# Patient Record
Sex: FEMALE | Race: OTHER | Employment: UNEMPLOYED | URBAN - METROPOLITAN AREA
[De-identification: names, ages, dates, MRNs, and addresses within clinical notes are randomized per-mention and may not be internally consistent; named-entity substitution may affect disease eponyms.]

---

## 2023-05-17 ENCOUNTER — OFFICE VISIT (OUTPATIENT)
Dept: INTERNAL MEDICINE CLINIC | Facility: CLINIC | Age: 48
End: 2023-05-17
Payer: MEDICAID

## 2023-05-17 ENCOUNTER — LAB ENCOUNTER (OUTPATIENT)
Dept: LAB | Facility: HOSPITAL | Age: 48
End: 2023-05-17
Attending: INTERNAL MEDICINE
Payer: MEDICAID

## 2023-05-17 VITALS
TEMPERATURE: 98 F | HEIGHT: 59 IN | DIASTOLIC BLOOD PRESSURE: 62 MMHG | OXYGEN SATURATION: 98 % | SYSTOLIC BLOOD PRESSURE: 104 MMHG | BODY MASS INDEX: 32.66 KG/M2 | HEART RATE: 74 BPM | WEIGHT: 162 LBS

## 2023-05-17 DIAGNOSIS — Z13.21 ENCOUNTER FOR VITAMIN DEFICIENCY SCREENING: ICD-10-CM

## 2023-05-17 DIAGNOSIS — R53.83 OTHER FATIGUE: ICD-10-CM

## 2023-05-17 DIAGNOSIS — M17.0 PRIMARY OSTEOARTHRITIS OF BOTH KNEES: ICD-10-CM

## 2023-05-17 DIAGNOSIS — E11.65 UNCONTROLLED TYPE 2 DIABETES MELLITUS WITH HYPERGLYCEMIA (HCC): ICD-10-CM

## 2023-05-17 DIAGNOSIS — Z12.4 PAP SMEAR FOR CERVICAL CANCER SCREENING: ICD-10-CM

## 2023-05-17 DIAGNOSIS — Z76.89 ESTABLISHING CARE WITH NEW DOCTOR, ENCOUNTER FOR: ICD-10-CM

## 2023-05-17 DIAGNOSIS — Z13.89 NEPHROPATHY SCREEN: ICD-10-CM

## 2023-05-17 DIAGNOSIS — Z11.59 NEED FOR HEPATITIS C SCREENING TEST: ICD-10-CM

## 2023-05-17 DIAGNOSIS — Z12.11 ENCOUNTER FOR SCREENING COLONOSCOPY: ICD-10-CM

## 2023-05-17 DIAGNOSIS — R73.09 ELEVATED GLUCOSE: ICD-10-CM

## 2023-05-17 DIAGNOSIS — R74.8 ELEVATED LIVER ENZYMES: ICD-10-CM

## 2023-05-17 DIAGNOSIS — Z76.89 ESTABLISHING CARE WITH NEW DOCTOR, ENCOUNTER FOR: Primary | ICD-10-CM

## 2023-05-17 DIAGNOSIS — Z13.5 DIABETIC RETINOPATHY SCREENING: ICD-10-CM

## 2023-05-17 LAB
ALBUMIN SERPL-MCNC: 3.6 G/DL (ref 3.4–5)
ALBUMIN/GLOB SERPL: 0.9 {RATIO} (ref 1–2)
ALP LIVER SERPL-CCNC: 120 U/L
ALT SERPL-CCNC: 158 U/L
ANION GAP SERPL CALC-SCNC: 5 MMOL/L (ref 0–18)
AST SERPL-CCNC: 83 U/L (ref 15–37)
BASOPHILS # BLD AUTO: 0.03 X10(3) UL (ref 0–0.2)
BASOPHILS NFR BLD AUTO: 0.3 %
BILIRUB SERPL-MCNC: 0.3 MG/DL (ref 0.1–2)
BUN BLD-MCNC: 10 MG/DL (ref 7–18)
BUN/CREAT SERPL: 21.7 (ref 10–20)
CALCIUM BLD-MCNC: 9.5 MG/DL (ref 8.5–10.1)
CHLORIDE SERPL-SCNC: 102 MMOL/L (ref 98–112)
CHOLEST SERPL-MCNC: 259 MG/DL (ref ?–200)
CO2 SERPL-SCNC: 28 MMOL/L (ref 21–32)
CREAT BLD-MCNC: 0.46 MG/DL
CREAT UR-SCNC: 50.1 MG/DL
DEPRECATED RDW RBC AUTO: 41 FL (ref 35.1–46.3)
EOSINOPHIL # BLD AUTO: 0.21 X10(3) UL (ref 0–0.7)
EOSINOPHIL NFR BLD AUTO: 2.4 %
ERYTHROCYTE [DISTWIDTH] IN BLOOD BY AUTOMATED COUNT: 13.2 % (ref 11–15)
EST. AVERAGE GLUCOSE BLD GHB EST-MCNC: 289 MG/DL (ref 68–126)
FASTING PATIENT LIPID ANSWER: NO
FASTING STATUS PATIENT QL REPORTED: NO
GFR SERPLBLD BASED ON 1.73 SQ M-ARVRAT: 117 ML/MIN/1.73M2 (ref 60–?)
GLOBULIN PLAS-MCNC: 4.1 G/DL (ref 2.8–4.4)
GLUCOSE BLD-MCNC: 279 MG/DL (ref 70–99)
HBA1C MFR BLD: 11.7 % (ref ?–5.7)
HCT VFR BLD AUTO: 43.2 %
HCV AB SERPL QL IA: NONREACTIVE
HDLC SERPL-MCNC: 45 MG/DL (ref 40–59)
HGB BLD-MCNC: 14.2 G/DL
IMM GRANULOCYTES # BLD AUTO: 0.01 X10(3) UL (ref 0–1)
IMM GRANULOCYTES NFR BLD: 0.1 %
LDLC SERPL CALC-MCNC: 180 MG/DL (ref ?–100)
LYMPHOCYTES # BLD AUTO: 4.47 X10(3) UL (ref 1–4)
LYMPHOCYTES NFR BLD AUTO: 50.9 %
MCH RBC QN AUTO: 28.1 PG (ref 26–34)
MCHC RBC AUTO-ENTMCNC: 32.9 G/DL (ref 31–37)
MCV RBC AUTO: 85.5 FL
MICROALBUMIN UR-MCNC: 0.6 MG/DL
MICROALBUMIN/CREAT 24H UR-RTO: 12 UG/MG (ref ?–30)
MONOCYTES # BLD AUTO: 0.53 X10(3) UL (ref 0.1–1)
MONOCYTES NFR BLD AUTO: 6 %
NEUTROPHILS # BLD AUTO: 3.53 X10 (3) UL (ref 1.5–7.7)
NEUTROPHILS # BLD AUTO: 3.53 X10(3) UL (ref 1.5–7.7)
NEUTROPHILS NFR BLD AUTO: 40.3 %
NONHDLC SERPL-MCNC: 214 MG/DL (ref ?–130)
OSMOLALITY SERPL CALC.SUM OF ELEC: 289 MOSM/KG (ref 275–295)
PLATELET # BLD AUTO: 201 10(3)UL (ref 150–450)
POTASSIUM SERPL-SCNC: 3.8 MMOL/L (ref 3.5–5.1)
PROT SERPL-MCNC: 7.7 G/DL (ref 6.4–8.2)
RBC # BLD AUTO: 5.05 X10(6)UL
SODIUM SERPL-SCNC: 135 MMOL/L (ref 136–145)
T4 FREE SERPL-MCNC: 0.9 NG/DL (ref 0.8–1.7)
TRIGL SERPL-MCNC: 182 MG/DL (ref 30–149)
TSI SER-ACNC: 0.93 MIU/ML (ref 0.36–3.74)
VIT D+METAB SERPL-MCNC: 36.4 NG/ML (ref 30–100)
VLDLC SERPL CALC-MCNC: 37 MG/DL (ref 0–30)
WBC # BLD AUTO: 8.8 X10(3) UL (ref 4–11)

## 2023-05-17 PROCEDURE — 85025 COMPLETE CBC W/AUTO DIFF WBC: CPT | Performed by: INTERNAL MEDICINE

## 2023-05-17 PROCEDURE — 3078F DIAST BP <80 MM HG: CPT | Performed by: INTERNAL MEDICINE

## 2023-05-17 PROCEDURE — 36415 COLL VENOUS BLD VENIPUNCTURE: CPT | Performed by: INTERNAL MEDICINE

## 2023-05-17 PROCEDURE — 82043 UR ALBUMIN QUANTITATIVE: CPT

## 2023-05-17 PROCEDURE — 84439 ASSAY OF FREE THYROXINE: CPT

## 2023-05-17 PROCEDURE — 3074F SYST BP LT 130 MM HG: CPT | Performed by: INTERNAL MEDICINE

## 2023-05-17 PROCEDURE — 82570 ASSAY OF URINE CREATININE: CPT

## 2023-05-17 PROCEDURE — 3061F NEG MICROALBUMINURIA REV: CPT | Performed by: INTERNAL MEDICINE

## 2023-05-17 PROCEDURE — 83036 HEMOGLOBIN GLYCOSYLATED A1C: CPT | Performed by: INTERNAL MEDICINE

## 2023-05-17 PROCEDURE — 99205 OFFICE O/P NEW HI 60 MIN: CPT | Performed by: INTERNAL MEDICINE

## 2023-05-17 PROCEDURE — 80061 LIPID PANEL: CPT | Performed by: INTERNAL MEDICINE

## 2023-05-17 PROCEDURE — 82306 VITAMIN D 25 HYDROXY: CPT

## 2023-05-17 PROCEDURE — 3046F HEMOGLOBIN A1C LEVEL >9.0%: CPT | Performed by: INTERNAL MEDICINE

## 2023-05-17 PROCEDURE — 80053 COMPREHEN METABOLIC PANEL: CPT

## 2023-05-17 PROCEDURE — 3008F BODY MASS INDEX DOCD: CPT | Performed by: INTERNAL MEDICINE

## 2023-05-17 PROCEDURE — 86803 HEPATITIS C AB TEST: CPT | Performed by: INTERNAL MEDICINE

## 2023-05-17 PROCEDURE — 84443 ASSAY THYROID STIM HORMONE: CPT

## 2023-05-18 ENCOUNTER — TELEPHONE (OUTPATIENT)
Dept: INTERNAL MEDICINE CLINIC | Facility: CLINIC | Age: 48
End: 2023-05-18

## 2023-05-18 ENCOUNTER — PATIENT MESSAGE (OUTPATIENT)
Dept: INTERNAL MEDICINE CLINIC | Facility: CLINIC | Age: 48
End: 2023-05-18

## 2023-05-18 RX ORDER — METFORMIN HYDROCHLORIDE 500 MG/1
500 TABLET, EXTENDED RELEASE ORAL 2 TIMES DAILY WITH MEALS
Qty: 180 TABLET | Refills: 0 | Status: SHIPPED | OUTPATIENT
Start: 2023-05-18

## 2023-05-18 RX ORDER — ATORVASTATIN CALCIUM 20 MG/1
20 TABLET, FILM COATED ORAL NIGHTLY
Qty: 90 TABLET | Refills: 1 | Status: SHIPPED | OUTPATIENT
Start: 2023-05-18

## 2023-05-18 RX ORDER — GLIPIZIDE 10 MG/1
10 TABLET, FILM COATED, EXTENDED RELEASE ORAL DAILY
Qty: 90 TABLET | Refills: 0 | Status: SHIPPED | OUTPATIENT
Start: 2023-05-18

## 2023-05-19 ENCOUNTER — TELEPHONE (OUTPATIENT)
Dept: ENDOCRINOLOGY | Facility: HOSPITAL | Age: 48
End: 2023-05-19

## 2023-05-19 RX ORDER — BLOOD-GLUCOSE METER
1 EACH MISCELLANEOUS 2 TIMES DAILY
Qty: 1 KIT | Refills: 0 | COMMUNITY
Start: 2023-05-19 | End: 2023-05-19

## 2023-05-19 RX ORDER — BLOOD PRESSURE TEST KIT
KIT MISCELLANEOUS
Qty: 1 EACH | Refills: 0 | Status: SHIPPED | OUTPATIENT
Start: 2023-05-19

## 2023-05-19 RX ORDER — LANCETS 30 GAUGE
EACH MISCELLANEOUS
Qty: 1 EACH | Refills: 0 | Status: SHIPPED | OUTPATIENT
Start: 2023-05-19

## 2023-05-19 NOTE — TELEPHONE ENCOUNTER
Consent Verification   Assessment completed with: Patient   HIPPA verified? Yes     General: Introduction of Diabetes Navigator services was done. Contact information given. A1C: 5/17/23 11.7    Medication Adherence: Navigator reviewed current medications. Per patient started all medications yesterday. Navigator explained indications, instructions and possible side effects. Encourage patient to call with any questions or concerns. Patient verbalized understanding all questions answered. Patient stated she needs glucometer to check BS. Navigator informed patient prescription were sent to pharmacy, they will notify her when its ready for pick-up. Referrals:   Ophthalmology: 09/22/2023 3:00pm   Rupert Santiago MD [address]   Diabetes Education: 5/24/2023 3:30pm Crow THEODORE: pending   Gastroenterology: 09/07/2023 2:30pm     Plan: Monthly follow-up

## 2023-05-19 NOTE — TELEPHONE ENCOUNTER
Pt's mychart: confirming if meds have been sent to pharm. If she needs to be checking blood sugar, if so, if she can get supplies ordered/prescribed. In regards to referrals - needs info to make appts. My chart message sent back to pt. meds sent to pharm on file, included what meds are for. Will check with Dr. Conor James re glucometer supplies. References provided to pt.

## 2023-05-24 ENCOUNTER — HOSPITAL ENCOUNTER (OUTPATIENT)
Dept: ENDOCRINOLOGY | Facility: HOSPITAL | Age: 48
Discharge: HOME | End: 2023-05-24
Attending: INTERNAL MEDICINE
Payer: MEDICAID

## 2023-05-24 VITALS — BODY MASS INDEX: 33 KG/M2 | WEIGHT: 161.13 LBS

## 2023-05-24 DIAGNOSIS — E11.65 UNCONTROLLED TYPE 2 DIABETES MELLITUS WITH HYPERGLYCEMIA (HCC): Primary | ICD-10-CM

## 2023-06-12 ENCOUNTER — HOSPITAL ENCOUNTER (OUTPATIENT)
Dept: ENDOCRINOLOGY | Facility: HOSPITAL | Age: 48
End: 2023-06-12
Attending: INTERNAL MEDICINE
Payer: MEDICAID

## 2023-06-12 VITALS — BODY MASS INDEX: 33 KG/M2 | WEIGHT: 162.31 LBS

## 2023-06-12 DIAGNOSIS — E11.65 UNCONTROLLED TYPE 2 DIABETES MELLITUS WITH HYPERGLYCEMIA (HCC): Primary | ICD-10-CM

## 2023-06-12 NOTE — PROGRESS NOTES
Joan Flynn  DOB9/7/1975 attended Step 3 Individual Diabetes Education Class:    Date: 6/12/2023  Start time: 3:15 pm End time: 4:15 pm      Wt: Wt Readings from Last 1 Encounters:  06/12/23 : 162 lb 4.8 oz    Weight change since start of program: 0    Blood Glucose: Not controlled: Progressing toward goal     Patient Is motivated and has moved to the action phase. She has been following the balanced plate and monitoring carbohydrate portions. Blood glucose log available for review. Fasting blood glucose range is  (14 readings, 3 out  of target). Patient has monitored blood glucose 2 hours  after dinner. Range is  (12 readings, 1 out of target)    Healthy Eating:  Reviewed Basic Diet Guidelines as foundation of diabetes meal planning. Reinforced the balanced plate method. Taught nutrition basics defining carbohydrate, protein, and fat. Taught label reading, including an option to count carbohydrate grams or servings. Provided patient with goals for carbohydrate grams/choices for meals and snacks. Discussed sugar substitutes, ETOH and effect on blood glucose. Tiffany's helpful for smart phones, as well as websites for examining carbohydrate levels provided. Reviewed and reinforced macronutrient's, carbohydrate counting and meal planning. Problem Solving:  Reinforced signs, symptoms, and treatment of hypoglycemia using the Rule of 15. Discussed impact of different food items and portion sizes on blood glucose levels. Discussed blood glucose target of 180 mg/dL, if testing 2 hours post meal.    Monitoring:  Reviewed blood glucose records and importance of tracking foods/blood glucose levels. Reinforced the importance of taking medications as prescribed. Behavior Change:  Reviewed and updated individual goals and action plan set by patient. Addressed barriers to tracking foods/blood glucose levels and following guidelines presented/achieving goals, as a group discussion. Recommendations:   Follow individual meal plan recommended by Educator (Marian Hernandez). Continue implementing individual goals. Attend remaining sessions. Begin implementing carbohydrate counting and continue dietary and blood glucose tracking. Written materials provided for all areas covered.     Patient verbalized understanding and has no further questions currently     Gudelia Aguirre RN

## 2023-07-17 ENCOUNTER — HOSPITAL ENCOUNTER (OUTPATIENT)
Dept: ENDOCRINOLOGY | Facility: HOSPITAL | Age: 48
End: 2023-07-17
Attending: INTERNAL MEDICINE
Payer: MEDICAID

## 2023-07-17 VITALS — BODY MASS INDEX: 34 KG/M2 | WEIGHT: 167.31 LBS

## 2023-07-17 DIAGNOSIS — E11.65 UNCONTROLLED TYPE 2 DIABETES MELLITUS WITH HYPERGLYCEMIA (HCC): Primary | ICD-10-CM

## 2023-07-17 NOTE — PROGRESS NOTES
Angelina Medina  DOB9/7/1975 attended Step 4 Group Diabetes Education Class:     Date: 7/17/2023  Start time: 3:15   End time: 4:15 pm    Wt: Wt Readings from Last 1 Encounters:  07/17/23 : 167 lb 4.8 oz    Weight change since start of program: +6.2 lbs    Blood Glucose: Controlled: Stable Fasting blood glucose levels range  (13 readings) 2 hrs after a meal  ( 13 readings)  Patient verbalized facing some food related challenges. Per patient, summer has been been challenging due to multiple family gatherings with a variety of unhealthy food choices. Reviewed information covered in previous classes including benefits of maintaining good blood glucose control and healthy weight in decreasing diabetes complications. Prevention, detection, and treatment of chronic complications  Reviewed how to reduce risks of complications including eye, foot, dental, renal, and cardiovascular. Discussed American Diabetes Association guidelines for testing including eye exam, lipid panels, urine protein tests, dental and foot exams. Encouraged to get annual flu shot. Discussed importance of immunizations, vaccinations, and guidelines for sick day management. Discussed wearing medical ID. Problem Solving and healthy coping  Discussed signs, symptoms, and prevention of DKA and HHNS. Instructed on developing and implementing a support plan. Discussed avoiding Diabetes burnout, dealing with stress and stress reduction techniques. Discussed recognizing and dealing with depression and diabetes. Healthy Eating  Reviewed and reinforced macronutrients, carbohydrate counting, and meal planning. Reviewed meal planning methods. Taught principles of heart healthy eating (fats, cholesterol, fiber, and sodium intake). Discussed Mediterranean meal planning. Taught label guidelines for choosing fat controlled proteins, snacks and desserts. Discussed how to integrate information for meal planning.    Discussed healthy eating approaches for dining out, holidays and special occasions. Provided tips on how family members can support healthy changes in diet. Behavior Change and being active  Reviewed and updated individual goals and action plan set by patient. Reinforced the benefits of exercise and safety precautions. Discussed the effects of blood glucose levels and role in weight loss    Recommendations:  Monitor blood glucose as directed. Follow individual meal plan and continue dietary tracking. Continue to implement individual goals. Patient will use the ADA website as well as other diabetes related helpful apps to continue her diabetes management. Written materials provided for all areas covered. Patient verbalized understanding and has no further questions at this time.      Janene Bynum RN

## 2023-07-19 ENCOUNTER — HOSPITAL ENCOUNTER (OUTPATIENT)
Dept: GENERAL RADIOLOGY | Facility: HOSPITAL | Age: 48
Discharge: HOME | End: 2023-07-19
Attending: INTERNAL MEDICINE
Payer: MEDICAID

## 2023-07-19 ENCOUNTER — OFFICE VISIT (OUTPATIENT)
Dept: INTERNAL MEDICINE CLINIC | Facility: CLINIC | Age: 48
End: 2023-07-19
Payer: MEDICAID

## 2023-07-19 VITALS
WEIGHT: 167 LBS | HEART RATE: 77 BPM | BODY MASS INDEX: 34 KG/M2 | OXYGEN SATURATION: 97 % | DIASTOLIC BLOOD PRESSURE: 56 MMHG | TEMPERATURE: 98 F | SYSTOLIC BLOOD PRESSURE: 90 MMHG

## 2023-07-19 DIAGNOSIS — M17.0 PRIMARY OSTEOARTHRITIS OF BOTH KNEES: ICD-10-CM

## 2023-07-19 DIAGNOSIS — M17.0 PRIMARY OSTEOARTHRITIS OF BOTH KNEES: Primary | ICD-10-CM

## 2023-07-19 PROCEDURE — 99214 OFFICE O/P EST MOD 30 MIN: CPT | Performed by: INTERNAL MEDICINE

## 2023-07-19 PROCEDURE — 3074F SYST BP LT 130 MM HG: CPT | Performed by: INTERNAL MEDICINE

## 2023-07-19 PROCEDURE — 3078F DIAST BP <80 MM HG: CPT | Performed by: INTERNAL MEDICINE

## 2023-07-19 PROCEDURE — 73564 X-RAY EXAM KNEE 4 OR MORE: CPT | Performed by: INTERNAL MEDICINE

## 2023-07-19 RX ORDER — NAPROXEN 500 MG/1
500 TABLET ORAL 2 TIMES DAILY PRN
Qty: 60 TABLET | Refills: 2 | Status: SHIPPED | OUTPATIENT
Start: 2023-07-19

## 2023-07-26 ENCOUNTER — TELEPHONE (OUTPATIENT)
Dept: INTERNAL MEDICINE CLINIC | Facility: CLINIC | Age: 48
End: 2023-07-26

## 2023-07-26 NOTE — TELEPHONE ENCOUNTER
Lmtcb       ----- Message from Graham Mccarty MD sent at 7/25/2023  4:09 PM CDT -----  Please inform pt xrayof knees shows OA. PT recommended. Order was placed in may.       PQ
General

## 2023-08-25 RX ORDER — GLIPIZIDE 10 MG/1
10 TABLET, FILM COATED, EXTENDED RELEASE ORAL DAILY
Qty: 90 TABLET | Refills: 0 | Status: SHIPPED | OUTPATIENT
Start: 2023-08-25

## 2023-08-25 RX ORDER — METFORMIN HYDROCHLORIDE 500 MG/1
500 TABLET, EXTENDED RELEASE ORAL 2 TIMES DAILY WITH MEALS
Qty: 180 TABLET | Refills: 0 | Status: SHIPPED | OUTPATIENT
Start: 2023-08-25

## 2023-10-25 ENCOUNTER — TELEPHONE (OUTPATIENT)
Dept: INTERNAL MEDICINE CLINIC | Facility: CLINIC | Age: 48
End: 2023-10-25

## 2023-10-25 NOTE — TELEPHONE ENCOUNTER
Patient called and said she needs a refill on her blood glucose supplies. She needs the swabs, lancets, and strips. Please advise patient when ready.

## 2023-10-26 RX ORDER — LANCETS 30 GAUGE
EACH MISCELLANEOUS
Qty: 100 EACH | Refills: 0 | Status: SHIPPED | OUTPATIENT
Start: 2023-10-26

## 2023-10-26 RX ORDER — BLOOD PRESSURE TEST KIT
KIT MISCELLANEOUS
Qty: 1 EACH | Refills: 0 | Status: SHIPPED | OUTPATIENT
Start: 2023-10-26

## 2023-10-26 NOTE — TELEPHONE ENCOUNTER
Patient is aware that refill was approved and that she has to comply with all of Dr Hany Nation recommendations, he had asked her to see gyne, gi and ophthalmology but it looks she can cancelled gi and did not show to gyne, she agreed to set up those appts again.

## 2023-10-31 RX ORDER — ATORVASTATIN CALCIUM 20 MG/1
20 TABLET, FILM COATED ORAL NIGHTLY
Qty: 90 TABLET | Refills: 1 | OUTPATIENT
Start: 2023-10-31

## 2023-11-29 RX ORDER — GLIPIZIDE 10 MG/1
10 TABLET, FILM COATED, EXTENDED RELEASE ORAL DAILY
Qty: 90 TABLET | Refills: 0 | OUTPATIENT
Start: 2023-11-29

## 2023-11-29 RX ORDER — ATORVASTATIN CALCIUM 20 MG/1
20 TABLET, FILM COATED ORAL NIGHTLY
Qty: 90 TABLET | Refills: 1 | OUTPATIENT
Start: 2023-11-29

## 2023-11-29 RX ORDER — METFORMIN HYDROCHLORIDE 500 MG/1
500 TABLET, EXTENDED RELEASE ORAL 2 TIMES DAILY WITH MEALS
Qty: 180 TABLET | Refills: 0 | OUTPATIENT
Start: 2023-11-29

## 2023-12-20 ENCOUNTER — TELEPHONE (OUTPATIENT)
Dept: INTERNAL MEDICINE CLINIC | Facility: CLINIC | Age: 48
End: 2023-12-20

## 2023-12-20 NOTE — TELEPHONE ENCOUNTER
Patient was contacted and was reminded to make an appt to follow up on her DM, she has no insurance coverage at the moment but will call our office once she gets covered by either the medical card or insurance through her employer. She didn't want PCP name to be removed from her record.        ----- Message from Jose Corona MD sent at 12/19/2023 10:59 AM CST -----  Needs follow up. Has not completed any of the recommendation provided. Please ensure not further refills are given until she follows up.      PQ

## 2024-01-31 ENCOUNTER — OFFICE VISIT (OUTPATIENT)
Dept: INTERNAL MEDICINE CLINIC | Facility: CLINIC | Age: 49
End: 2024-01-31
Payer: MEDICAID

## 2024-01-31 ENCOUNTER — LAB ENCOUNTER (OUTPATIENT)
Dept: LAB | Facility: REFERENCE LAB | Age: 49
End: 2024-01-31
Attending: INTERNAL MEDICINE
Payer: MEDICAID

## 2024-01-31 VITALS
BODY MASS INDEX: 37 KG/M2 | TEMPERATURE: 98 F | SYSTOLIC BLOOD PRESSURE: 108 MMHG | DIASTOLIC BLOOD PRESSURE: 60 MMHG | HEART RATE: 72 BPM | OXYGEN SATURATION: 97 % | WEIGHT: 181 LBS

## 2024-01-31 DIAGNOSIS — Z11.59 NEED FOR HEPATITIS C SCREENING TEST: ICD-10-CM

## 2024-01-31 DIAGNOSIS — Z12.4 PAP SMEAR FOR CERVICAL CANCER SCREENING: ICD-10-CM

## 2024-01-31 DIAGNOSIS — B35.1 ONYCHOMYCOSIS: ICD-10-CM

## 2024-01-31 DIAGNOSIS — E11.65 UNCONTROLLED TYPE 2 DIABETES MELLITUS WITH HYPERGLYCEMIA (HCC): ICD-10-CM

## 2024-01-31 DIAGNOSIS — E56.9 VITAMIN DEFICIENCY: ICD-10-CM

## 2024-01-31 DIAGNOSIS — Z00.00 PREVENTATIVE HEALTH CARE: ICD-10-CM

## 2024-01-31 DIAGNOSIS — Z13.89 NEPHROPATHY SCREEN: ICD-10-CM

## 2024-01-31 DIAGNOSIS — Z12.31 SCREENING MAMMOGRAM FOR BREAST CANCER: ICD-10-CM

## 2024-01-31 DIAGNOSIS — Z12.11 ENCOUNTER FOR SCREENING COLONOSCOPY: ICD-10-CM

## 2024-01-31 DIAGNOSIS — R06.83 SNORING: ICD-10-CM

## 2024-01-31 DIAGNOSIS — E11.65 UNCONTROLLED TYPE 2 DIABETES MELLITUS WITH HYPERGLYCEMIA (HCC): Primary | ICD-10-CM

## 2024-01-31 DIAGNOSIS — R53.83 OTHER FATIGUE: ICD-10-CM

## 2024-01-31 DIAGNOSIS — Z13.5 DIABETIC RETINOPATHY SCREENING: ICD-10-CM

## 2024-01-31 DIAGNOSIS — M65.331 TRIGGER MIDDLE FINGER OF RIGHT HAND: ICD-10-CM

## 2024-01-31 LAB
ALBUMIN SERPL-MCNC: 4.5 G/DL (ref 3.2–4.8)
ALBUMIN/GLOB SERPL: 1.4 {RATIO} (ref 1–2)
ALP LIVER SERPL-CCNC: 105 U/L
ALT SERPL-CCNC: 52 U/L
ANION GAP SERPL CALC-SCNC: 5 MMOL/L (ref 0–18)
AST SERPL-CCNC: 33 U/L (ref ?–34)
BASOPHILS # BLD AUTO: 0.02 X10(3) UL (ref 0–0.2)
BASOPHILS NFR BLD AUTO: 0.2 %
BILIRUB SERPL-MCNC: 0.4 MG/DL (ref 0.3–1.2)
BUN BLD-MCNC: 7 MG/DL (ref 9–23)
BUN/CREAT SERPL: 14.9 (ref 10–20)
CALCIUM BLD-MCNC: 9.8 MG/DL (ref 8.7–10.4)
CHLORIDE SERPL-SCNC: 106 MMOL/L (ref 98–112)
CHOLEST SERPL-MCNC: 257 MG/DL (ref ?–200)
CO2 SERPL-SCNC: 28 MMOL/L (ref 21–32)
CREAT BLD-MCNC: 0.47 MG/DL
CREAT UR-SCNC: 33.4 MG/DL
DEPRECATED RDW RBC AUTO: 40 FL (ref 35.1–46.3)
EGFRCR SERPLBLD CKD-EPI 2021: 116 ML/MIN/1.73M2 (ref 60–?)
EOSINOPHIL # BLD AUTO: 0.27 X10(3) UL (ref 0–0.7)
EOSINOPHIL NFR BLD AUTO: 3.1 %
ERYTHROCYTE [DISTWIDTH] IN BLOOD BY AUTOMATED COUNT: 13.2 % (ref 11–15)
EST. AVERAGE GLUCOSE BLD GHB EST-MCNC: 154 MG/DL (ref 68–126)
FASTING PATIENT LIPID ANSWER: NO
FASTING STATUS PATIENT QL REPORTED: NO
GLOBULIN PLAS-MCNC: 3.3 G/DL (ref 2.8–4.4)
GLUCOSE BLD-MCNC: 102 MG/DL (ref 70–99)
HBA1C MFR BLD: 7 % (ref ?–5.7)
HCT VFR BLD AUTO: 41.5 %
HCV AB SERPL QL IA: NONREACTIVE
HDLC SERPL-MCNC: 50 MG/DL (ref 40–59)
HGB BLD-MCNC: 13.8 G/DL
IMM GRANULOCYTES # BLD AUTO: 0.02 X10(3) UL (ref 0–1)
IMM GRANULOCYTES NFR BLD: 0.2 %
LDLC SERPL CALC-MCNC: 186 MG/DL (ref ?–100)
LYMPHOCYTES # BLD AUTO: 4.26 X10(3) UL (ref 1–4)
LYMPHOCYTES NFR BLD AUTO: 48.4 %
MCH RBC QN AUTO: 27.9 PG (ref 26–34)
MCHC RBC AUTO-ENTMCNC: 33.3 G/DL (ref 31–37)
MCV RBC AUTO: 84 FL
MICROALBUMIN UR-MCNC: <0.3 MG/DL
MONOCYTES # BLD AUTO: 0.56 X10(3) UL (ref 0.1–1)
MONOCYTES NFR BLD AUTO: 6.4 %
NEUTROPHILS # BLD AUTO: 3.68 X10 (3) UL (ref 1.5–7.7)
NEUTROPHILS # BLD AUTO: 3.68 X10(3) UL (ref 1.5–7.7)
NEUTROPHILS NFR BLD AUTO: 41.7 %
NONHDLC SERPL-MCNC: 207 MG/DL (ref ?–130)
OSMOLALITY SERPL CALC.SUM OF ELEC: 286 MOSM/KG (ref 275–295)
PLATELET # BLD AUTO: 227 10(3)UL (ref 150–450)
POTASSIUM SERPL-SCNC: 3.7 MMOL/L (ref 3.5–5.1)
PROT SERPL-MCNC: 7.8 G/DL (ref 5.7–8.2)
RBC # BLD AUTO: 4.94 X10(6)UL
SODIUM SERPL-SCNC: 139 MMOL/L (ref 136–145)
T4 FREE SERPL-MCNC: 0.9 NG/DL (ref 0.8–1.7)
TRIGL SERPL-MCNC: 119 MG/DL (ref 30–149)
TSI SER-ACNC: 1.44 MIU/ML (ref 0.55–4.78)
VIT D+METAB SERPL-MCNC: 23.2 NG/ML (ref 30–100)
VLDLC SERPL CALC-MCNC: 25 MG/DL (ref 0–30)
WBC # BLD AUTO: 8.8 X10(3) UL (ref 4–11)

## 2024-01-31 PROCEDURE — 82043 UR ALBUMIN QUANTITATIVE: CPT

## 2024-01-31 PROCEDURE — 84443 ASSAY THYROID STIM HORMONE: CPT

## 2024-01-31 PROCEDURE — 36415 COLL VENOUS BLD VENIPUNCTURE: CPT | Performed by: INTERNAL MEDICINE

## 2024-01-31 PROCEDURE — 82306 VITAMIN D 25 HYDROXY: CPT

## 2024-01-31 PROCEDURE — 99215 OFFICE O/P EST HI 40 MIN: CPT | Performed by: INTERNAL MEDICINE

## 2024-01-31 PROCEDURE — 84439 ASSAY OF FREE THYROXINE: CPT

## 2024-01-31 PROCEDURE — 80061 LIPID PANEL: CPT | Performed by: INTERNAL MEDICINE

## 2024-01-31 PROCEDURE — 85025 COMPLETE CBC W/AUTO DIFF WBC: CPT | Performed by: INTERNAL MEDICINE

## 2024-01-31 PROCEDURE — 80053 COMPREHEN METABOLIC PANEL: CPT

## 2024-01-31 PROCEDURE — 86803 HEPATITIS C AB TEST: CPT | Performed by: INTERNAL MEDICINE

## 2024-01-31 PROCEDURE — 3074F SYST BP LT 130 MM HG: CPT | Performed by: INTERNAL MEDICINE

## 2024-01-31 PROCEDURE — 82570 ASSAY OF URINE CREATININE: CPT

## 2024-01-31 PROCEDURE — 3078F DIAST BP <80 MM HG: CPT | Performed by: INTERNAL MEDICINE

## 2024-01-31 PROCEDURE — 83036 HEMOGLOBIN GLYCOSYLATED A1C: CPT

## 2024-01-31 RX ORDER — TERBINAFINE HYDROCHLORIDE 250 MG/1
250 TABLET ORAL DAILY
Qty: 90 TABLET | Refills: 0 | Status: SHIPPED | OUTPATIENT
Start: 2024-01-31

## 2024-01-31 NOTE — PROGRESS NOTES
[facility]  Return Patient      HPI:     Chief Complaint   Patient presents with    Follow - Up       Donna Robison is a 48 year old female presenting for:  Follow up.  Has  has a past medical history of Diabetes (HCC).     Hx of DM II.  Not on any medications currently due to loss of insurance last year.      Right hand middle finger trigger finger symptoms.  4th finger with neuropathy.          Results for orders placed or performed in visit on 05/17/23   Microalb/Creat Ratio, Random Urine   Result Value Ref Range    Microalbumin, Urine 0.60 mg/dL    Creatinine Ur Random 50.10 mg/dL    Malb/Cre Calc 12.0 <=30.0 ug/mg   Comp Metabolic Panel (14)   Result Value Ref Range    Glucose 279 (H) 70 - 99 mg/dL    Sodium 135 (L) 136 - 145 mmol/L    Potassium 3.8 3.5 - 5.1 mmol/L    Chloride 102 98 - 112 mmol/L    CO2 28.0 21.0 - 32.0 mmol/L    Anion Gap 5 0 - 18 mmol/L    BUN 10 7 - 18 mg/dL    Creatinine 0.46 (L) 0.55 - 1.02 mg/dL    BUN/CREA Ratio 21.7 (H) 10.0 - 20.0    Calcium, Total 9.5 8.5 - 10.1 mg/dL    Calculated Osmolality 289 275 - 295 mOsm/kg    eGFR-Cr 117 >=60 mL/min/1.73m2     (H) 13 - 56 U/L    AST 83 (H) 15 - 37 U/L    Alkaline Phosphatase 120 (H) 39 - 100 U/L    Bilirubin, Total 0.3 0.1 - 2.0 mg/dL    Total Protein 7.7 6.4 - 8.2 g/dL    Albumin 3.6 3.4 - 5.0 g/dL    Globulin  4.1 2.8 - 4.4 g/dL    A/G Ratio 0.9 (L) 1.0 - 2.0    Patient Fasting for CMP? No    TSH and Free T4   Result Value Ref Range    Free T4 0.9 0.8 - 1.7 ng/dL    TSH 0.932 0.358 - 3.740 mIU/mL   Vitamin D, 25-Hydroxy   Result Value Ref Range    Vitamin D, 25OH, Total 36.4 30.0 - 100.0 ng/mL             Labs:   Complete Metabolic Panel:  Lab Results   Component Value Date/Time     (L) 05/17/2023 03:39 PM    K 3.8 05/17/2023 03:39 PM     05/17/2023 03:39 PM    CO2 28.0 05/17/2023 03:39 PM    CREATSERUM 0.46 (L) 05/17/2023 03:39 PM    CA 9.5 05/17/2023 03:39 PM     (H) 05/17/2023 03:39 PM     TP 7.7 05/17/2023 03:39 PM    ALB 3.6 05/17/2023 03:39 PM    ALKPHO 120 (H) 05/17/2023 03:39 PM    AST 83 (H) 05/17/2023 03:39 PM     (H) 05/17/2023 03:39 PM    BILT 0.3 05/17/2023 03:39 PM    TSH 0.932 05/17/2023 03:39 PM    T4F 0.9 05/17/2023 03:39 PM        Hemoglobin A1C, Microalbumin  Lab Results   Component Value Date/Time    A1C 11.7 (H) 05/17/2023 03:39 PM        Lipid panel  Lab Results   Component Value Date/Time    CHOLEST 259 (H) 05/17/2023 03:39 PM    HDL 45 05/17/2023 03:39 PM    TRIG 182 (H) 05/17/2023 03:39 PM     (H) 05/17/2023 03:39 PM    NONHDLC 214 (H) 05/17/2023 03:39 PM     The 10-year ASCVD risk score (Primo MONTIEL, et al., 2019) is: 3.1%    Values used to calculate the score:      Age: 48 years      Sex: Female      Is Non- : No      Diabetic: Yes      Tobacco smoker: No      Systolic Blood Pressure: 108 mmHg      Is BP treated: No      HDL Cholesterol: 45 mg/dL      Total Cholesterol: 259 mg/dL       Medications:  Current Outpatient Medications   Medication Sig Dispense Refill    diclofenac 1 % External Gel Apply 2 g topically 4 (four) times daily. 1 each 1    terbinafine (LAMISIL) 250 MG Oral Tab Take 1 tablet (250 mg total) by mouth daily. 90 tablet 0    Alcohol Swabs Does not apply Pads Clean site with alcohol wipe. Allow to dry. (Patient not taking: Reported on 1/31/2024) 1 each 0    Lancets Does not apply Misc Use with glucometer, check BG 2 times daily. (Patient not taking: Reported on 1/31/2024) 100 each 0    Blood Gluc Meter Disp-Strips Does not apply Device Use with glucometer to check BG 2 times daily. (Patient not taking: Reported on 1/31/2024) 100 each 0    glipiZIDE ER (GLIPIZIDE XL) 10 MG Oral Tablet 24 Hr Take 1 tablet (10 mg total) by mouth daily. (Patient not taking: Reported on 1/31/2024) 90 tablet 0    metFORMIN  MG Oral Tablet 24 Hr Take 1 tablet (500 mg total) by mouth 2 (two) times daily with meals. (Patient not taking: Reported  on 2024) 180 tablet 0    dapagliflozin (FARXIGA) 5 MG Oral Tab Take 1 tablet (5 mg total) by mouth daily. (Patient not taking: Reported on 2024) 90 tablet 0    Blood Glucose Monitoring Suppl Does not apply Device Use glucometer to check blood glucose in am, fasting and 2 hrs after meal. (Patient not taking: Reported on 2024) 1 each 0    atorvastatin 20 MG Oral Tab Take 1 tablet (20 mg total) by mouth nightly. (Patient not taking: Reported on 2024) 90 tablet 1      PMH:  Past Medical History:   Diagnosis Date    Diabetes (HCC)          PSH:  Past Surgical History:   Procedure Laterality Date    APPENDECTOMY            , ,     HERNIA SURGERY      umbilical       Allergies:  No Known Allergies   Social History:  Social History     Socioeconomic History    Marital status: Unknown   Tobacco Use    Smoking status: Never    Smokeless tobacco: Never   Substance and Sexual Activity    Alcohol use: Yes     Comment: social    Drug use: Never        Family History:  Family History   Problem Relation Age of Onset    No Known Problems Father     Depression Mother     Hypertension Mother     No Known Problems Son     Asthma Son     No Known Problems Sister     No Known Problems Brother           REVIEW OF SYSTEMS:   Review of Systems   Constitutional:  Negative for chills, fatigue, fever and unexpected weight change.   HENT:  Negative for congestion, ear pain, hearing loss, rhinorrhea, sinus pain and sore throat.    Eyes:  Negative for pain, redness and visual disturbance.   Respiratory:  Negative for apnea, cough, chest tightness, shortness of breath and wheezing.    Cardiovascular:  Negative for chest pain, palpitations and leg swelling.   Gastrointestinal:  Negative for abdominal distention, abdominal pain, blood in stool, constipation and nausea.   Endocrine: Negative for cold intolerance, heat intolerance and polyuria.   Genitourinary:  Negative for dysuria, hematuria and  urgency.   Musculoskeletal:  Positive for arthralgias, joint swelling and myalgias. Negative for back pain, gait problem and neck pain.   Skin:  Negative for rash and wound.   Allergic/Immunologic: Negative for food allergies and immunocompromised state.   Neurological:  Negative for dizziness, seizures, facial asymmetry, speech difficulty, weakness, light-headedness, numbness and headaches.   Hematological:  Negative for adenopathy. Does not bruise/bleed easily.   Psychiatric/Behavioral:  Negative for behavioral problems, sleep disturbance and suicidal ideas. The patient is not nervous/anxious.             PHYSICAL EXAM:   /60 (BP Location: Right arm, Patient Position: Sitting, Cuff Size: adult)   Pulse 72   Temp 97.9 °F (36.6 °C) (Temporal)   Wt 181 lb (82.1 kg)   LMP 11/23/2023   SpO2 97%   BMI 36.56 kg/m²  Estimated body mass index is 36.56 kg/m² as calculated from the following:    Height as of 5/17/23: 4' 11\" (1.499 m).    Weight as of this encounter: 181 lb (82.1 kg).     Wt Readings from Last 3 Encounters:   01/31/24 181 lb (82.1 kg)   07/19/23 167 lb (75.8 kg)   07/17/23 167 lb 4.8 oz (75.9 kg)       Physical Exam  Vitals reviewed.   Constitutional:       General: She is not in acute distress.     Appearance: She is well-developed.   HENT:      Head: Normocephalic and atraumatic.   Eyes:      Conjunctiva/sclera: Conjunctivae normal.      Pupils: Pupils are equal, round, and reactive to light.   Neck:      Thyroid: No thyromegaly.   Cardiovascular:      Rate and Rhythm: Normal rate and regular rhythm.      Heart sounds: Normal heart sounds, S1 normal and S2 normal. No murmur heard.     No friction rub. No gallop.   Pulmonary:      Effort: Pulmonary effort is normal. No respiratory distress.      Breath sounds: Normal breath sounds. No wheezing or rales.   Chest:      Chest wall: No tenderness.   Abdominal:      General: Bowel sounds are normal. There is no distension.      Palpations: Abdomen is  soft. There is no mass.      Tenderness: There is no abdominal tenderness. There is no guarding or rebound.   Musculoskeletal:         General: No tenderness. Normal range of motion.      Cervical back: Normal range of motion.   Lymphadenopathy:      Cervical: No cervical adenopathy.   Skin:     General: Skin is warm.      Findings: No erythema or rash.      Comments: Bilateral barefoot skin diabetic exam is normal, visualized feet and the appearance is normal.  Bilateral monofilament/sensation of both feet is normal.  Pulsation pedal pulse exam of both lower legs/feet is normal as well.       Neurological:      Mental Status: She is alert and oriented to person, place, and time.      Cranial Nerves: No cranial nerve deficit.      Deep Tendon Reflexes: Reflexes are normal and symmetric.   Psychiatric:         Behavior: Behavior normal.         Thought Content: Thought content normal.         Judgment: Judgment normal.       Bilateral barefoot skin diabetic exam is normal, visualized feet and the appearance is normal.  Bilateral monofilament/sensation of both feet is normal.  Pulsation pedal pulse exam of both lower legs/feet is normal as well.  Evidence of onychomycosis on nails.              ASSESSMENT AND PLAN:   Patient is a 48 year old female who presents primarily presents for:    (E11.65) Uncontrolled type 2 diabetes mellitus with hyperglycemia (HCC)  (primary encounter diagnosis)  Comment: Not on any medication.  Will await labs to determine meds to start  Plan: Comp Metabolic Panel (14), Lipid Panel,         Hemoglobin A1C (Glycohemoglobin) [E]            (Z13.89) Nephropathy screen  Comment: Urine studies pending.  Plan: Microalb/Creat Ratio, Random Urine            (Z00.00) Preventative health care  Plan: CBC With Differential With Platelet, TSH and         Free T4            (Z11.59) Need for hepatitis C screening test  Plan: HCV Antibody            (E56.9) Vitamin deficiency  Comment: Labs pending  Plan:  Vitamin D            (Z12.4) Pap smear for cervical cancer screening  Comment: Upcoming appt with gynecology  Plan: OBG Referral - In Network            (R53.83) Other fatigue  Comment: Fatigue labs pending.  Plan: DIAGOSTIC SLEEP STUDY, General sleep study            (R06.83) Snoring  Comment: sleep study ordered.  Plan: DIAGOSTIC SLEEP STUDY, General sleep study            (Z13.5) Diabetic retinopathy screening  Comment: Referral provided.  Plan: OPHTHALMOLOGY - INTERNAL            (Z12.31) Screening mammogram for breast cancer  Comment: Order provided.  Plan: [facility] ZOE 2D+3D SCREENING BILAT         (CPT=77067/87728)            (Z12.11) Encounter for screening colonoscopy  Plan: Gastro GI Telephone Colon Screen            (T36.899) Trigger middle finger of right hand  Comment: Trial of diclofenac gel and conservative treatment.  Plan: Ortho Referral - In Network          (B35.1) Onychomycosis.  Will treat with lamisil           Health Maintenance:  Annual Physical Never done  Colorectal Cancer Screening Never done  COVID-19 Vaccine(1) Never done  DTaP,Tdap,and Td Vaccines(1 - Tdap) Never done  Pap Smear Never done  Annual Depression Screening Never done  Zoster Vaccines(1 of 2) due on 09/07/2023  Influenza Vaccine(Season Ended) due on 10/01/2023  Mammogram due on 03/13/2024  Pneumococcal Vaccine: Birth to 64yrs Aged Out          Meds & Refills for this Visit:  Requested Prescriptions     Signed Prescriptions Disp Refills    diclofenac 1 % External Gel 1 each 1     Sig: Apply 2 g topically 4 (four) times daily.    terbinafine (LAMISIL) 250 MG Oral Tab 90 tablet 0     Sig: Take 1 tablet (250 mg total) by mouth daily.       Orders Placed This Encounter   Procedures    CBC With Differential With Platelet    Comp Metabolic Panel (14)    HCV Antibody    Lipid Panel    Microalb/Creat Ratio, Random Urine    TSH and Free T4    Vitamin D    Hemoglobin A1C (Glycohemoglobin) [E]       Imaging & Consults:  OBG - INTERNAL  OP  [facility] ALT REFERRAL DIAGOSTIC SLEEP STUDY ADULT  OPHTHALMOLOGY - INTERNAL  ORTHOPEDIC - INTERNAL  OP REFERRAL TO [facility] GI TELEPHONE COLON SCREEN  [facility] ZOE 2D+3D SCREENING BILAT (CPT=77067/00936)        William Braun MD     No follow-ups on file.  Important issues to follow up on next visit      Patient indicates understanding of the above recommendations and agrees to the above plan.  Reasurrance and education provided. All questions answered.  Notified to call with any questions, complications, allergies, or worsening or changing symptoms as well as any side effects or complications from the treatments .  Red flags/ ER precautions discussed.    Total time spent was 48 minutes which includes: Preparation to see patient including chart review, reviewing appropriate medical history, counseling and education (diet and exercise), discussing treatment options, ordering appropriate diagnostic tests and documentation.      William Braun MD  Internal Medicine/Primary Care  [facility]

## 2024-02-06 ENCOUNTER — PATIENT MESSAGE (OUTPATIENT)
Dept: INTERNAL MEDICINE CLINIC | Facility: CLINIC | Age: 49
End: 2024-02-06

## 2024-02-06 RX ORDER — VITAMIN B COMPLEX
1 TABLET ORAL DAILY
Qty: 90 TABLET | Refills: 1 | Status: SHIPPED | OUTPATIENT
Start: 2024-02-06 | End: 2024-03-07

## 2024-02-06 RX ORDER — ATORVASTATIN CALCIUM 20 MG/1
20 TABLET, FILM COATED ORAL NIGHTLY
Qty: 90 TABLET | Refills: 1 | Status: SHIPPED | OUTPATIENT
Start: 2024-02-06

## 2024-02-09 NOTE — TELEPHONE ENCOUNTER
Pt reports heel pain x 3 days. [email or web address] message sent - trying to see if she has heel spurs vs plantar fascitis vs other issue.

## 2024-02-12 ENCOUNTER — NURSE TRIAGE (OUTPATIENT)
Dept: INTERNAL MEDICINE CLINIC | Facility: CLINIC | Age: 49
End: 2024-02-12

## 2024-02-12 NOTE — TELEPHONE ENCOUNTER
S/w   # [id number] Camilaoma Thompson stated has left heel pain 2/6/24. Pt denied to look at area to describe symptoms since she is currently at work.      Disposition: Seen today since no appts advised to go to [facility].     Pt advised for worsening symptoms of swelling of Left foot with inability to stand or numbness to proceed to the ED.       Reason for Disposition   SEVERE pain (e.g., excruciating, unable to do any normal activities)    Answer Assessment - Initial Assessment Questions  1. ONSET: \"When did the pain start?\"       2/6/24  2. LOCATION: \"Where is the pain located?\"       Left hell pain  3. PAIN: \"How bad is the pain?\"    (Scale 1-10; or mild, moderate, severe)   - MILD (1-3): doesn't interfere with normal activities.    - MODERATE (4-7): interferes with normal activities (e.g., work or school) or awakens from sleep, limping.    - SEVERE (8-10): excruciating pain, unable to do any normal activities, unable to walk.       7/10 with Ibuprofen; without Ibuprofen 10/10 in the AM.  4. WORK OR EXERCISE: \"Has there been any recent work or exercise that involved this part of the body?\"       Denied  5. CAUSE: \"What do you think is causing the foot pain?\"      Unknown   6. OTHER SYMPTOMS: \"Do you have any other symptoms?\" (e.g., leg pain, rash, fever, numbness)      Has not checked her heel and declined to check since she is at work.  7. PREGNANCY: \"Is there any chance you are pregnant?\" \"When was your last menstrual period?\"      N/A    Protocols used: Foot Pain-A-[place]

## 2024-02-12 NOTE — TELEPHONE ENCOUNTER
Attempted to contact pt. Unable to reach. Message left on voice mail to call and speak with RN for triage.

## 2024-02-28 ENCOUNTER — OFFICE VISIT (OUTPATIENT)
Dept: OBGYN CLINIC | Facility: CLINIC | Age: 49
End: 2024-02-28
Payer: MEDICAID

## 2024-02-28 VITALS
HEIGHT: 59 IN | DIASTOLIC BLOOD PRESSURE: 72 MMHG | WEIGHT: 181.81 LBS | BODY MASS INDEX: 36.65 KG/M2 | SYSTOLIC BLOOD PRESSURE: 110 MMHG

## 2024-02-28 DIAGNOSIS — Z01.419 ENCOUNTER FOR ANNUAL ROUTINE GYNECOLOGICAL EXAMINATION: Primary | ICD-10-CM

## 2024-02-28 DIAGNOSIS — Z12.4 SCREENING FOR CERVICAL CANCER: ICD-10-CM

## 2024-02-28 PROCEDURE — 99386 PREV VISIT NEW AGE 40-64: CPT | Performed by: OBSTETRICS & GYNECOLOGY

## 2024-02-28 PROCEDURE — 87624 HPV HI-RISK TYP POOLED RSLT: CPT | Performed by: OBSTETRICS & GYNECOLOGY

## 2024-02-28 NOTE — PROGRESS NOTES
ANNUAL GYN EXAM  [facility]    CHIEF COMPLAINT:    Chief Complaint   Patient presents with    Pap      HISTORY OF PRESENT ILLNESS:   Donna Robison is a 48 year old female   who presents for annual well woman visit.  She is feeling well without complaints.    State had only had bleeding light 2023, but prior to that no menses.    Denies hot flashes.     PAST GYNECOLOGICAL HISTORY & OTHER PREVENTIVE MEDICINE  LMP: Patient's last menstrual period was 2023.  Hx Prior Abnormal Pap: No (2024  1:05 PM)  Pap Result Notes: per pt pap done 3 years ago WNL (2024  1:05 PM)    Complications: per HPI  Gravita/Parity:   Contraception: current -menopausal; Previous -   Sexually transmitted disease history:None  Number of sexual partners: current sexual partners: 1, 10 yrs, Lifetime partners:   Pap history:  Last pap/result: normal per patient.  ; history abnormals:   Date of last mammogram: has order 2023; history abnormals   Last Colonoscopy: has order; history abnormals   Abuse history: denies  Vaginal discharge: denies  Bladder symptoms: denies    PAST MEDICAL HISTORY:   Past Medical History:   Diagnosis Date    Diabetes (HCC)     Diabetes mellitus (HCC)         PAST SURGICAL HISTORY:   Past Surgical History:   Procedure Laterality Date    Appendectomy            , ,     D & c      Hernia surgery  2008    umbilical    Laparoscopic salpingostomy          PAST OB HISTORY:  OB History    Para Term  AB Living   5 3 3   2 2   SAB IAB Ectopic Multiple Live Births       1   3      # Outcome Date GA Lbr Daniel/2nd Weight Sex Delivery Anes PTL Lv   5 Term 2003 39w0d   M CS-Unspec   KODI      Birth Comments: breech   4 Term 2002 40w0d   M CS-Unspec   KODI      Birth Comments: breech   3 AB  10w0d       DEC   2 Term  40w0d   M    ND      Birth Comments: stillbornn   1 Ectopic                CURRENT MEDICATIONS:      Current Outpatient Medications:      dapagliflozin (FARXIGA) 10 MG Oral Tab, Take 1 tablet (10 mg total) by mouth daily., Disp: 90 tablet, Rfl: 1    atorvastatin 20 MG Oral Tab, Take 1 tablet (20 mg total) by mouth nightly., Disp: 90 tablet, Rfl: 1    Cholecalciferol (VITAMIN D) 25 MCG (1000 UT) Oral Tab, Take 1 tablet by mouth daily., Disp: 90 tablet, Rfl: 1    diclofenac 1 % External Gel, Apply 2 g topically 4 (four) times daily., Disp: 1 each, Rfl: 1    terbinafine (LAMISIL) 250 MG Oral Tab, Take 1 tablet (250 mg total) by mouth daily., Disp: 90 tablet, Rfl: 0    Alcohol Swabs Does not apply Pads, Clean site with alcohol wipe. Allow to dry. (Patient not taking: Reported on 1/31/2024), Disp: 1 each, Rfl: 0    Lancets Does not apply Misc, Use with glucometer, check BG 2 times daily. (Patient not taking: Reported on 1/31/2024), Disp: 100 each, Rfl: 0    Blood Gluc Meter Disp-Strips Does not apply Device, Use with glucometer to check BG 2 times daily. (Patient not taking: Reported on 1/31/2024), Disp: 100 each, Rfl: 0    Blood Glucose Monitoring Suppl Does not apply Device, Use glucometer to check blood glucose in am, fasting and 2 hrs after meal. (Patient not taking: Reported on 1/31/2024), Disp: 1 each, Rfl: 0    ALLERGIES:  No Known Allergies    SOCIAL HISTORY:  Social History     Socioeconomic History    Marital status: Unknown   Tobacco Use    Smoking status: Never    Smokeless tobacco: Never   Substance and Sexual Activity    Alcohol use: Yes     Comment: social    Drug use: Never    Sexual activity: Yes   Other Topics Concern    Blood Transfusions No       FAMILY HISTORY:  Family History   Problem Relation Age of Onset    No Known Problems Father     Depression Mother     Hypertension Mother     No Known Problems Son     Asthma Son     No Known Problems Sister     No Known Problems Brother     Breast Cancer Neg     Uterine Cancer Neg     Ovarian Cancer Neg      ASSESSMENTS:  REVIEW OF SYSTEMS:  CONSTITUTIONAL:  negative for fevers, chills and  sweats    EYES:  negative for  blurred vision and visual disturbance  RESPIRATORY:  negative for  cough and shortness of breath  CARDIOVASCULAR:  negative for  chest pain, palpitations  GASTROINTESTINAL:  No constipation/diarrhea, no pain  GENITOURINARY:  See History of Present Illness  INTEGUMENT/BREAST: Breast: no masses, no nipple discharge  ENDOCRINE:  negative for acne, constipation, diarrhea, cold intolerance, heat intolerance, fatigue, hair loss, weight gain and weight loss  MUSCULOSKELETAL:  negative for joint pain  NEUROLOGICAL:  negative for dizziness/lightheadedness and headaches  BEHAVIOR/PSYCH:  Negative for depressed mood, anhedonia and anxiety    PHYSICAL EXAM  Patient's last menstrual period was 11/23/2023.   Vitals:    02/28/24 1307   BP: 110/72   Weight: 181 lb 12.8 oz (82.5 kg)   Height: 59\"       CONSTITUTIONAL: Awake, alert, cooperative, no apparent distress, and appears stated age     GENITAL/URINARY:    External Genitalia:  General appearance; normal, Hair distribution; normal, Lesions absent   Urethral Meatus:  Lesions absent, Prolapse absent  Bladder:  Tenderness absent, Cystocele absent  Vagina:  Discharge absent, Lesions absent, Pelvic support normal  Cervix:  Lesions absent, Discharge absent, Tenderness absent  Uterus:  Size normal, Masses absent, Tenderness absent  Adnexa:  Masses absent, Tenderness absent  Anus/Perineum:  Lesions absent    MUSCULOSKELETAL: There is no redness, warmth, or swelling of the joints.  Full range of motion noted.  Motor strength is 5 out of 5 all extremities bilaterally.  Tone is normal.  NEUROLOGIC: Patient is awake, alert and oriented to name, place and time.  Casual gait is normal.  SKIN: no bruising or bleeding and no rashes  PSYCHIATRIC: Behavior:  Appropriate  Mood:  appropriate  ASSESSMENT AND PLAN:  1. Encounter for annual routine gynecological examination  Mammogram and colonoscopy ordered by PCP    2. Screening for cervical cancer    - ThinPrep PAP  Smear; Future  - Hpv Dna  High Risk , Thin Prep Collect; Future       Preventive Medicine in a 48 year old female  Health Maintenance Topics with due status: Overdue       Topic Date Due    Annual Physical Never done    Diabetes Care Dilated Eye Exam Never done    Colorectal Cancer Screening Never done    Pneumococcal Vaccine: Birth to 64yrs Never done    DTaP,Tdap,and Td Vaccines Never done    Pap Smear Never done    COVID-19 Vaccine Never done    Zoster Vaccines Never done    Influenza Vaccine 10/01/2023    Annual Depression Screening Never done     Health Maintenance Topics with due status: Due Soon       Topic Date Due    Mammogram 03/13/2024       COUNSELING/EDUCATION PERFORMED:   Contraception.  Form chosen:  menopausal  method use review  emergency contraception  Cervical Cancer Screening  Breast Cancer Screening - monthly self breast exam  Appropriate diet  Exercise    follow up 1 yr or as needed  Gale Sotomayor MD

## 2024-02-29 LAB — HPV I/H RISK 1 DNA SPEC QL NAA+PROBE: NEGATIVE

## 2024-03-09 ENCOUNTER — HOSPITAL ENCOUNTER (OUTPATIENT)
Dept: MAMMOGRAPHY | Facility: HOSPITAL | Age: 49
Discharge: HOME | End: 2024-03-09
Attending: INTERNAL MEDICINE
Payer: MEDICAID

## 2024-03-09 DIAGNOSIS — Z12.31 SCREENING MAMMOGRAM FOR BREAST CANCER: ICD-10-CM

## 2024-03-09 PROCEDURE — 77067 SCR MAMMO BI INCL CAD: CPT | Performed by: INTERNAL MEDICINE

## 2024-03-09 PROCEDURE — 77063 BREAST TOMOSYNTHESIS BI: CPT | Performed by: INTERNAL MEDICINE

## 2024-05-16 ENCOUNTER — TELEPHONE (OUTPATIENT)
Dept: GASTROENTEROLOGY | Facility: CLINIC | Age: 49
End: 2024-05-16

## 2024-05-16 ENCOUNTER — OFFICE VISIT (OUTPATIENT)
Dept: GASTROENTEROLOGY | Facility: CLINIC | Age: 49
End: 2024-05-16

## 2024-05-16 VITALS
HEART RATE: 83 BPM | DIASTOLIC BLOOD PRESSURE: 62 MMHG | SYSTOLIC BLOOD PRESSURE: 108 MMHG | BODY MASS INDEX: 36.62 KG/M2 | WEIGHT: 181.63 LBS | HEIGHT: 59 IN

## 2024-05-16 DIAGNOSIS — Z12.12 SCREENING FOR COLORECTAL CANCER: Primary | ICD-10-CM

## 2024-05-16 DIAGNOSIS — K59.00 CONSTIPATION, UNSPECIFIED CONSTIPATION TYPE: ICD-10-CM

## 2024-05-16 DIAGNOSIS — Z12.11 SCREENING FOR COLORECTAL CANCER: Primary | ICD-10-CM

## 2024-05-16 PROCEDURE — S0285 CNSLT BEFORE SCREEN COLONOSC: HCPCS | Performed by: INTERNAL MEDICINE

## 2024-05-16 NOTE — PATIENT INSTRUCTIONS
1. Schedule colonoscopy with MAC at [facility] on a Friday and endoscopist directed sedation (I.e. IV sedation) eligible [dx: colorectal cancer screening]    2.  bowel prep from pharmacy (split golytely) - please read attached/given instructions very carefully     3. Medication     Hold farxiga 4 days prior  Continue all medications for procedure    4. Read all bowel prep instructions carefully    5. AVOID seeds, nuts, popcorn, raw fruits and vegetables (cooked is okay) for 2-3 days before procedure    >>>Please note: if you were prescribed a bowel prep and it is too expensive or not covered by insurance, it is okay to substitute Trilyte or Golytely (or any similar generic prep). This can be done by notifying the pharmacy or calling our office.

## 2024-05-16 NOTE — TELEPHONE ENCOUNTER
Scheduled for:  Colonoscopy [id number]  Provider Name:  Dr. Davis  Date:  8/16/24  Location:[facility]  Sedation:  MAC  Time:  3:00 Pm (Patient is aware arrival time is at 2:00 Pm )  Prep:  [place] Golytely   Meds/Allergies Reconciled?:  Physician Reviewed   Diagnosis with codes:  Colon cancer screening Z12.11  Was patient informed to call insurance with codes (Y/N):  Yes  Referral sent?:  Referral was sent at the time of electronic surgical scheduling.  EM or [facility] notified?:  I sent an electronic request to Endo Scheduling and received a confirmation today.  Medication Orders:  Pt is aware to NOT take iron pills, herbal meds and diet supplements for 7 days before exam. Also to NOT take any form of alcohol, recreational drugs and any forms of ED meds 24 hours before exam.   Hold farxiga 4 days prior  Continue all medications for procedure  Misc Orders:  Patient was informed about the new cancellation policy for his/her procedure. Patient was also given a copy of the cancellation policy at the time of the appointment and verbalized understanding.   Further instructions given by staff:  I provide prep instructions to patient at the time of the appointment and reviewed date, time and location, patient verbalized that she understood and is aware to call if she has any questions.

## 2024-05-16 NOTE — TELEPHONE ENCOUNTER
PPD PA-    Patient is scheduled within the next two weeks please check for PA.  Patient scheduled for Colonoscopy [id number] at [facility] on 8/16/24 at 3:00 Pm   Diagnosis: Colorectal cancer screening Z12.11.Z12.12  Thanks!   Roney

## 2024-05-16 NOTE — H&P
[facility] - Gastroenterology                                                                                                  Clinic History and Physical       Referring provider: Kade    Chief Complaint   Patient presents with    Colonoscopy Screening     The following visit was carried out with a phone  including obtaining the history of present illness and discussing the assessment and plan as well as obtaining consent    HPI:   Donna Robison is a 48 year old [place]uneian-speaking woman with history of BMI 36, diabetes, appendectomy, umbilical hernia surgery,  x 3, laparoscopic salpingostomy, D&C here regarding colorectal cancer screening    Has never had a colonoscopy prior. Denies family history of colon cancer. Denies any gatrointestinal symptoms aside from occassional constipation for some years which she describes as occasional and sometimes uses laxatives.    History, Medications, Allergies, ROS:      Past Medical History:    Diabetes (HCC)    Diabetes mellitus (HCC)      Past Surgical History:   Procedure Laterality Date    Appendectomy            , ,     D & c      Hernia surgery      umbilical    Laparoscopic salpingostomy        Family Hx:   Family History   Problem Relation Age of Onset    No Known Problems Father     Depression Mother     Hypertension Mother     No Known Problems Son     Asthma Son     No Known Problems Sister     No Known Problems Brother     Breast Cancer Neg     Uterine Cancer Neg     Ovarian Cancer Neg       Social History:   Social History     Socioeconomic History    Marital status: Unknown   Tobacco Use    Smoking status: Never    Smokeless tobacco: Never   Substance and Sexual Activity    Alcohol use: Yes     Comment: social    Drug use: Never    Sexual activity: Yes   Other Topics Concern    Blood Transfusions No     Social  Determinants of Health     Financial Resource Strain: Not on File (2023)    Received from [facility]     Financial Resource Strain     Financial Resource Strain: 0   Food Insecurity: Not on File (2023)    Received from [facility]     Food Insecurity     Food: 0   Transportation Needs: Not on File (2023)    Received from [facility]     Transportation Needs     Transportation: 0   Physical Activity: Not on File (2023)    Received from [facility]     Physical Activity     Physical Activity: 0   Stress: Not on File (2023)    Received from [facility]     Stress     Stress: 0   Social Connections: Not on File (2023)    Received from [facility]     Social Connections     Social Connections and Isolation: 0   Housing Stability: Not on File (2023)    Received from [facility]     Housing Stability     Housin        Medications (Active prior to today's visit):  Current Outpatient Medications   Medication Sig Dispense Refill    dapagliflozin (FARXIGA) 10 MG Oral Tab Take 1 tablet (10 mg total) by mouth daily. 90 tablet 1    atorvastatin 20 MG Oral Tab Take 1 tablet (20 mg total) by mouth nightly. 90 tablet 1    diclofenac 1 % External Gel Apply 2 g topically 4 (four) times daily. 1 each 1    terbinafine (LAMISIL) 250 MG Oral Tab Take 1 tablet (250 mg total) by mouth daily. 90 tablet 0    Alcohol Swabs Does not apply Pads Clean site with alcohol wipe. Allow to dry. (Patient not taking: Reported on 2024) 1 each 0    Lancets Does not apply Misc Use with glucometer, check BG 2 times daily. (Patient not taking: Reported on 2024) 100 each 0    Blood Gluc Meter Disp-Strips Does not apply Device Use with glucometer to check BG 2 times daily. (Patient not taking: Reported on 2024) 100 each 0    Blood Glucose Monitoring Suppl Does not apply Device Use glucometer to check blood glucose in am, fasting and 2 hrs after meal. (Patient not taking: Reported on 2024) 1 each 0     Allergies:  No Known Allergies    ROS:   Systems  were reviewed and were negative except as noted in the HPI    PHYSICAL EXAM:   Height 4' 11\" (1.499 m), weight 181 lb 9.6 oz (82.4 kg), last menstrual period 2023.    General:awake, cooperative, no acute distress  HEENT: EOMI, no scleral icterus, MMM; oral pharnyx is without exudates or lesions  Neck: no lymphadenopathy; thyroid is not enlarged and without nodules  CV: RRR  Resp: non-labored breathing  Abd: soft, non-tender, non-distended  Ext: no lower extremity swelling  Neuro: Alert, Oriented X 3  Skin: no rashes, bruises  Psych: normal affect    Labs/Imaging:     Reviewed as noted in the HPI and A/P    ASSESSMENT/PLAN:   Donna Robison is a 48 year old Ukrainian-speaking woman with history of BMI 36, diabetes, appendectomy, umbilical hernia surgery,  x 3, laparoscopic salpingostomy, D&C here regarding colorectal cancer screening    Average risk for colorectal cancer without prior colonoscopy or colorectal cancer screening exam.    I reviewed her TSH and T4 studies from the end of 2024.  Discussed constipation is chronic mild and likely idiopathic. Recommended behavioral modification including physical activity, increase fluid/water intake and trying an otc fiber supplement    Recommend:  -colonoscopy with MAC at [facility] on a Friday with split golytely - hold farxiga 4 days prior  -fiber supplement    Colonoscopy consent: I have discussed the risks, benefits, and alternatives (including stool testing) to colonoscopy with the patient [who demonstrated understanding], including but not limited to the risks of bleeding, infection, pain, as well as the risks of anesthesia and perforation all leading to prolonged hospitalization, surgical intervention, or could be life threatening. I also specifically mentioned the risk of missed lesions/polyps. All questions were answered to the patient’s satisfaction. The patient signed informed consent and elected to proceed with colonoscopy with  intervention [i.e. polypectomy, biopsy, control of bleeding, etc.] as indicated. I also discussed a ride home from a family member of friend is required and driving after the procedure with sedation is not safe or recommended.    Orders This Visit:  No orders of the defined types were placed in this encounter.    Meds This Visit:  Requested Prescriptions      No prescriptions requested or ordered in this encounter     Imaging & Referrals:  None     Nicholas Davis MD  [facility] - Gastroenterology  5/16/2024

## 2024-05-16 NOTE — TELEPHONE ENCOUNTER
WiliRn's  Patient will be having Procedure on on 8/16/24 at [facility] for Colonoscopy @3:00 Pm   Please Call PCP or Endocrine for the following recommendation for Medications adjustment of Diabetic meds or insulin   Or Anti-coagulants or anti-platelet   Prior to Procedure  Hold farxiga 4 days prior  Continue all medications for procedure  Roney

## 2024-05-17 NOTE — TELEPHONE ENCOUNTER
Patient is only on Farxiga (diabetic medication), per notes/order she was told to hold this 4 days prior and can continue all other medications.

## 2024-05-24 ENCOUNTER — PATIENT MESSAGE (OUTPATIENT)
Dept: INTERNAL MEDICINE CLINIC | Facility: CLINIC | Age: 49
End: 2024-05-24

## 2024-05-24 NOTE — TELEPHONE ENCOUNTER
From: Donna Robison  To: William Braun  Sent: 5/24/2024 5:12 AM CDT  Subject: Resequedad    Gilson buenos maren ,quería saber si m podría drake algún medicamento para la resequedad, tengo mucha comezón isabelle la tengo en Todo el cuerpo annia más en la amee , recuerdo que me dijo usted que a lo mejor un medicamento me causaría eso annia ya siento que es mucho más n mi amee channing buen día

## 2024-07-16 ENCOUNTER — LAB ENCOUNTER (OUTPATIENT)
Dept: LAB | Facility: HOSPITAL | Age: 49
End: 2024-07-16
Attending: INTERNAL MEDICINE
Payer: MEDICAID

## 2024-07-16 ENCOUNTER — OFFICE VISIT (OUTPATIENT)
Dept: INTERNAL MEDICINE CLINIC | Facility: CLINIC | Age: 49
End: 2024-07-16
Payer: MEDICAID

## 2024-07-16 VITALS
DIASTOLIC BLOOD PRESSURE: 72 MMHG | SYSTOLIC BLOOD PRESSURE: 110 MMHG | HEIGHT: 59 IN | OXYGEN SATURATION: 96 % | WEIGHT: 176 LBS | BODY MASS INDEX: 35.48 KG/M2 | TEMPERATURE: 97 F | HEART RATE: 92 BPM

## 2024-07-16 DIAGNOSIS — E11.9 CONTROLLED TYPE 2 DIABETES MELLITUS WITHOUT COMPLICATION, WITHOUT LONG-TERM CURRENT USE OF INSULIN (HCC): ICD-10-CM

## 2024-07-16 DIAGNOSIS — Z00.00 ANNUAL PHYSICAL EXAM: Primary | ICD-10-CM

## 2024-07-16 DIAGNOSIS — Z13.89 NEPHROPATHY SCREEN: ICD-10-CM

## 2024-07-16 LAB
ALBUMIN SERPL-MCNC: 4.2 G/DL (ref 3.2–4.8)
ALBUMIN/GLOB SERPL: 1.5 {RATIO} (ref 1–2)
ALP LIVER SERPL-CCNC: 110 U/L
ALT SERPL-CCNC: 49 U/L
ANION GAP SERPL CALC-SCNC: 6 MMOL/L (ref 0–18)
AST SERPL-CCNC: 33 U/L (ref ?–34)
BASOPHILS # BLD AUTO: 0.02 X10(3) UL (ref 0–0.2)
BASOPHILS NFR BLD AUTO: 0.3 %
BILIRUB SERPL-MCNC: 0.2 MG/DL (ref 0.3–1.2)
BUN BLD-MCNC: 12 MG/DL (ref 9–23)
BUN/CREAT SERPL: 20.7 (ref 10–20)
CALCIUM BLD-MCNC: 9.4 MG/DL (ref 8.7–10.4)
CHLORIDE SERPL-SCNC: 108 MMOL/L (ref 98–112)
CO2 SERPL-SCNC: 28 MMOL/L (ref 21–32)
CREAT BLD-MCNC: 0.58 MG/DL
CREAT UR-SCNC: 87.4 MG/DL
DEPRECATED RDW RBC AUTO: 40.4 FL (ref 35.1–46.3)
EGFRCR SERPLBLD CKD-EPI 2021: 110 ML/MIN/1.73M2 (ref 60–?)
EOSINOPHIL # BLD AUTO: 0.26 X10(3) UL (ref 0–0.7)
EOSINOPHIL NFR BLD AUTO: 3.5 %
ERYTHROCYTE [DISTWIDTH] IN BLOOD BY AUTOMATED COUNT: 12.9 % (ref 11–15)
EST. AVERAGE GLUCOSE BLD GHB EST-MCNC: 243 MG/DL (ref 68–126)
FASTING STATUS PATIENT QL REPORTED: NO
GLOBULIN PLAS-MCNC: 2.8 G/DL (ref 2–3.5)
GLUCOSE BLD-MCNC: 155 MG/DL (ref 70–99)
HBA1C MFR BLD: 10.1 % (ref ?–5.7)
HCT VFR BLD AUTO: 40.4 %
HGB BLD-MCNC: 12.9 G/DL
IMM GRANULOCYTES # BLD AUTO: 0.01 X10(3) UL (ref 0–1)
IMM GRANULOCYTES NFR BLD: 0.1 %
LYMPHOCYTES # BLD AUTO: 3.29 X10(3) UL (ref 1–4)
LYMPHOCYTES NFR BLD AUTO: 43.9 %
MCH RBC QN AUTO: 27.6 PG (ref 26–34)
MCHC RBC AUTO-ENTMCNC: 31.9 G/DL (ref 31–37)
MCV RBC AUTO: 86.5 FL
MICROALBUMIN UR-MCNC: 0.3 MG/DL
MICROALBUMIN/CREAT 24H UR-RTO: 3.4 UG/MG (ref ?–30)
MONOCYTES # BLD AUTO: 0.44 X10(3) UL (ref 0.1–1)
MONOCYTES NFR BLD AUTO: 5.9 %
NEUTROPHILS # BLD AUTO: 3.48 X10 (3) UL (ref 1.5–7.7)
NEUTROPHILS # BLD AUTO: 3.48 X10(3) UL (ref 1.5–7.7)
NEUTROPHILS NFR BLD AUTO: 46.3 %
OSMOLALITY SERPL CALC.SUM OF ELEC: 297 MOSM/KG (ref 275–295)
PLATELET # BLD AUTO: 210 10(3)UL (ref 150–450)
POTASSIUM SERPL-SCNC: 3.6 MMOL/L (ref 3.5–5.1)
PROT SERPL-MCNC: 7 G/DL (ref 5.7–8.2)
RBC # BLD AUTO: 4.67 X10(6)UL
SODIUM SERPL-SCNC: 142 MMOL/L (ref 136–145)
WBC # BLD AUTO: 7.5 X10(3) UL (ref 4–11)

## 2024-07-16 PROCEDURE — 82570 ASSAY OF URINE CREATININE: CPT

## 2024-07-16 PROCEDURE — 90471 IMMUNIZATION ADMIN: CPT | Performed by: INTERNAL MEDICINE

## 2024-07-16 PROCEDURE — 36415 COLL VENOUS BLD VENIPUNCTURE: CPT | Performed by: INTERNAL MEDICINE

## 2024-07-16 PROCEDURE — 99214 OFFICE O/P EST MOD 30 MIN: CPT | Performed by: INTERNAL MEDICINE

## 2024-07-16 PROCEDURE — 83036 HEMOGLOBIN GLYCOSYLATED A1C: CPT | Performed by: INTERNAL MEDICINE

## 2024-07-16 PROCEDURE — 99396 PREV VISIT EST AGE 40-64: CPT | Performed by: INTERNAL MEDICINE

## 2024-07-16 PROCEDURE — 85025 COMPLETE CBC W/AUTO DIFF WBC: CPT | Performed by: INTERNAL MEDICINE

## 2024-07-16 PROCEDURE — 80053 COMPREHEN METABOLIC PANEL: CPT

## 2024-07-16 PROCEDURE — 90677 PCV20 VACCINE IM: CPT | Performed by: INTERNAL MEDICINE

## 2024-07-16 PROCEDURE — 82043 UR ALBUMIN QUANTITATIVE: CPT

## 2024-07-16 NOTE — PROGRESS NOTES
[facility] part of [facility]  Return Patient Progress Note      HPI:     Chief Complaint   Patient presents with    Physical     Physical   Prev shot   Shingles kizzy Robison is a 48 year old female presenting for:    Has a significant  has a past medical history of Diabetes (HCC) and Diabetes mellitus (HCC).    Here for annual.   Requesting prevnar 20.     Labs:   CMP:  Lab Results   Component Value Date/Time     01/31/2024 02:46 PM    K 3.7 01/31/2024 02:46 PM     01/31/2024 02:46 PM    CO2 28.0 01/31/2024 02:46 PM    CREATSERUM 0.47 (L) 01/31/2024 02:46 PM    CA 9.8 01/31/2024 02:46 PM     (H) 01/31/2024 02:46 PM    TP 7.8 01/31/2024 02:46 PM    ALB 4.5 01/31/2024 02:46 PM    ALKPHO 105 (H) 01/31/2024 02:46 PM    AST 33 01/31/2024 02:46 PM    ALT 52 (H) 01/31/2024 02:46 PM    BILT 0.4 01/31/2024 02:46 PM    TSH 1.435 01/31/2024 02:46 PM    T4F 0.9 01/31/2024 02:46 PM        CBC:  Lab Results   Component Value Date    WBC 8.8 01/31/2024    HGB 13.8 01/31/2024    HCT 41.5 01/31/2024    .0 01/31/2024    NEPERCENT 41.7 01/31/2024    LYPERCENT 48.4 01/31/2024    MOPERCENT 6.4 01/31/2024    EOPERCENT 3.1 01/31/2024    BAPERCENT 0.2 01/31/2024    NE 3.68 01/31/2024    LYMABS 4.26 (H) 01/31/2024    MOABSO 0.56 01/31/2024    EOABSO 0.27 01/31/2024    BAABSO 0.02 01/31/2024          Hemoglobin A1C, Microalbumin  Lab Results   Component Value Date/Time    A1C 7.0 (H) 01/31/2024 02:46 PM        Lipid panel  Lab Results   Component Value Date/Time    CHOLEST 257 (H) 01/31/2024 02:46 PM    HDL 50 01/31/2024 02:46 PM    TRIG 119 01/31/2024 02:46 PM     (H) 01/31/2024 02:46 PM    NONHDLC 207 (H) 01/31/2024 02:46 PM        Medications:  Current Outpatient Medications   Medication Sig Dispense Refill    dapagliflozin (FARXIGA) 10 MG Oral Tab Take 1 tablet (10 mg total) by mouth daily. 90 tablet 1    atorvastatin 20 MG Oral Tab Take 1 tablet (20 mg total) by mouth  nightly. 90 tablet 1    Lancets Does not apply Misc Use with glucometer, check BG 2 times daily. 100 each 0    Blood Gluc Meter Disp-Strips Does not apply Device Use with glucometer to check BG 2 times daily. 100 each 0    Blood Glucose Monitoring Suppl Does not apply Device Use glucometer to check blood glucose in am, fasting and 2 hrs after meal. 1 each 0      PMH:  Past Medical History:    Diabetes (HCC)    Diabetes mellitus (HCC)         PSH:  Past Surgical History:   Procedure Laterality Date    Appendectomy            , ,     D & c      Hernia surgery      umbilical    Laparoscopic salpingostomy         Allergies:  No Known Allergies   Social History:  Social History     Socioeconomic History    Marital status: Unknown   Tobacco Use    Smoking status: Never    Smokeless tobacco: Never   Vaping Use    Vaping status: Never Used   Substance and Sexual Activity    Alcohol use: Yes     Comment: social    Drug use: Never    Sexual activity: Yes   Other Topics Concern    Blood Transfusions No     Social Determinants of Health     Financial Resource Strain: Not on File (2023)    Received from [facility]    Financial Resource Strain     Financial Resource Strain: 0   Food Insecurity: Not on File (2023)    Received from [facility]    Food Insecurity     Food: 0   Transportation Needs: Not on File (2023)    Received from [facility]    Transportation Needs     Transportation: 0   Physical Activity: Not on File (2023)    Received from [facility]    Physical Activity     Physical Activity: 0   Stress: Not on File (2023)    Received from [facility]    Stress     Stress: 0   Social Connections: Not on File (2023)    Received from [facility]    Social Connections     Social Connections and Isolation: 0   Housing Stability: Not on File (2023)    Received from [facility]    Housing Stability     Housin      Family History:  Family History   Problem  Relation Age of Onset    No Known Problems Father     Depression Mother     Hypertension Mother     No Known Problems Son     Asthma Son     No Known Problems Sister     No Known Problems Brother     Breast Cancer Neg     Uterine Cancer Neg     Ovarian Cancer Neg               REVIEW OF SYSTEMS:   Review of Systems   Constitutional:  Negative for chills, fatigue, fever and unexpected weight change.   HENT:  Negative for congestion, ear pain, hearing loss, rhinorrhea, sinus pain and sore throat.    Eyes:  Negative for pain, redness and visual disturbance.   Respiratory:  Negative for apnea, cough, chest tightness, shortness of breath and wheezing.    Cardiovascular:  Negative for chest pain, palpitations and leg swelling.   Gastrointestinal:  Negative for abdominal distention, abdominal pain, blood in stool, constipation and nausea.   Endocrine: Negative for cold intolerance, heat intolerance and polyuria.   Genitourinary:  Negative for dysuria, hematuria and urgency.   Musculoskeletal:  Negative for arthralgias, back pain, gait problem, joint swelling, myalgias and neck pain.   Skin:  Negative for rash and wound.   Allergic/Immunologic: Negative for food allergies and immunocompromised state.   Neurological:  Negative for dizziness, seizures, facial asymmetry, speech difficulty, weakness, light-headedness, numbness and headaches.   Hematological:  Negative for adenopathy. Does not bruise/bleed easily.   Psychiatric/Behavioral:  Negative for behavioral problems, sleep disturbance and suicidal ideas. The patient is not nervous/anxious.             PHYSICAL EXAM:   /72   Pulse 92   Temp 97 °F (36.1 °C)   Ht 4' 11\" (1.499 m)   Wt 176 lb (79.8 kg)   LMP 11/23/2023   SpO2 96%   BMI 35.55 kg/m²  Estimated body mass index is 35.55 kg/m² as calculated from the following:    Height as of this encounter: 4' 11\" (1.499 m).    Weight as of this encounter: 176 lb (79.8 kg).     Wt Readings from Last 3 Encounters:    07/16/24 176 lb (79.8 kg)   05/16/24 181 lb 9.6 oz (82.4 kg)   02/28/24 181 lb 12.8 oz (82.5 kg)       Physical Exam  Vitals reviewed.   Constitutional:       General: She is not in acute distress.     Appearance: She is well-developed.   HENT:      Head: Normocephalic and atraumatic.   Eyes:      Conjunctiva/sclera: Conjunctivae normal.      Pupils: Pupils are equal, round, and reactive to light.   Neck:      Thyroid: No thyromegaly.   Cardiovascular:      Rate and Rhythm: Normal rate and regular rhythm.      Heart sounds: Normal heart sounds, S1 normal and S2 normal. No murmur heard.     No friction rub. No gallop.   Pulmonary:      Effort: Pulmonary effort is normal. No respiratory distress.      Breath sounds: Normal breath sounds. No wheezing or rales.   Chest:      Chest wall: No tenderness.   Abdominal:      General: Bowel sounds are normal. There is no distension.      Palpations: Abdomen is soft. There is no mass.      Tenderness: There is no abdominal tenderness. There is no guarding or rebound.   Musculoskeletal:         General: No tenderness. Normal range of motion.      Cervical back: Normal range of motion.   Lymphadenopathy:      Cervical: No cervical adenopathy.   Skin:     General: Skin is warm.      Findings: No erythema or rash.   Neurological:      Mental Status: She is alert and oriented to person, place, and time.      Cranial Nerves: No cranial nerve deficit.      Deep Tendon Reflexes: Reflexes are normal and symmetric.   Psychiatric:         Behavior: Behavior normal.         Thought Content: Thought content normal.         Judgment: Judgment normal.               ASSESSMENT AND PLAN:   Patient is a 48 year old female who presents primarily presents for:    (Z00.00) Annual physical exam  (primary encounter diagnosis)  During the annual physical exam I spent extensive time discussing medical history including existing conditions, past surgeries, allergies and medications.  Lifestyle factors  discussed including diet, exercise and substance abuse including alcohol and tobacco if warranted.  Counseled on screening tests based on age and underlying risk factors which may include but not limited to blood pressure measurement, cholesterol screening, diabetes screening and prostate cancer screening.  Preventative screenings discussed.  Reviewed immunizations.  Sexual health discussed if appropriate.  Family history reviewed.  I addressed any specific concerns or symptoms that the patient had.      (Z13.89) Nephropathy screen  Plan: Microalb/Creat Ratio, Random Urine          (E11.9) Controlled type 2 diabetes mellitus without complication, without long-term current use of insulin (HCC)  Plan: CBC With Differential With Platelet, Comp         Metabolic Panel (14), Hemoglobin A1C, CANCELED:        Lipid Panel           Health Maintenance:    Health Maintenance   Topic Date Due    Annual Physical  Never done    Colorectal Cancer Screening  Never done    Pneumococcal Vaccine: Birth to 64yrs (1 of 2 - PCV) Never done    DTaP,Tdap,and Td Vaccines (1 - Tdap) Never done    COVID-19 Vaccine (1 - 2023-24 season) Never done    Zoster Vaccines (1 of 2) Never done    Annual Depression Screening  Never done    Diabetes Care A1C  07/31/2024    Influenza Vaccine (1) 10/01/2024    Diabetes Care Foot Exam  01/31/2025    Diabetes Care: GFR  01/31/2025    Diabetes Care: Microalb/Creat Ratio  01/31/2025    Mammogram  03/09/2025    Diabetes Care Dilated Eye Exam  04/01/2025    Pap Smear  02/28/2027         Meds & Refills for this Visit:  Requested Prescriptions      No prescriptions requested or ordered in this encounter       No orders of the defined types were placed in this encounter.      Imaging & Consults:  None          No follow-ups on file.  Important follow up notes/labs for next visit      Patient indicates understanding of the above recommendations and agrees to the above plan.  Reasurrance and education provided. All  questions answered.    Notified to call with any questions, complications, allergies, or worsening or changing symptoms as well as any side effects or complications from the treatments .  Red flags/ ER precautions discussed.    If diagnostic labs or imaging ordered advised patient to contact my office for results  24-48 hours after completion    This note was dictated using dragon speech recognition transcription software.  Typographical and transcription errors may be present.  Please call if any questions.             William Braun MD  [facility]

## 2024-07-17 RX ORDER — ATORVASTATIN CALCIUM 20 MG/1
20 TABLET, FILM COATED ORAL NIGHTLY
Qty: 90 TABLET | Refills: 1 | Status: SHIPPED | OUTPATIENT
Start: 2024-07-17

## 2024-07-17 RX ORDER — DAPAGLIFLOZIN 10 MG/1
10 TABLET, FILM COATED ORAL DAILY
Qty: 90 TABLET | Refills: 1 | Status: SHIPPED | OUTPATIENT
Start: 2024-07-17

## 2024-07-18 ENCOUNTER — TELEPHONE (OUTPATIENT)
Dept: INTERNAL MEDICINE CLINIC | Facility: CLINIC | Age: 49
End: 2024-07-18

## 2024-07-18 NOTE — TELEPHONE ENCOUNTER
----- Message from William Braun sent at 7/17/2024 10:59 AM CDT -----  Please inform patient that her hemoglobin A1c returned significantly uncontrolled.  She is now back to an A1c above 10.0.  Ensure she is taking Farxiga.  She should start metformin 1000 twice daily.  Should see me in 6 weeks and take her sugars daily and bring in a glucose log.  Ensure she is taking atorvastatin.  Please remind her of risk factors with uncontrolled diabetes including cardiovascular risk factors such as myocardial infarction or stroke.  She is also high risk for renal insufficiency.  Please schedule at my next available in 6 weeks.    Rest of labs with no significant abnormality.

## 2024-07-18 NOTE — TELEPHONE ENCOUNTER
Results given and discussed with patient above, recommendations were made, patient verbalized understanding. No further questions at this time.     Scheduled patient for 08/28/2024 6 mo-f/u

## 2024-08-16 ENCOUNTER — HOSPITAL ENCOUNTER (OUTPATIENT)
Facility: HOSPITAL | Age: 49
Setting detail: HOSPITAL OUTPATIENT SURGERY
Discharge: HOME | End: 2024-08-16
Attending: INTERNAL MEDICINE | Admitting: INTERNAL MEDICINE
Payer: MEDICAID

## 2024-08-16 ENCOUNTER — ANESTHESIA EVENT (OUTPATIENT)
Dept: ENDOSCOPY | Facility: HOSPITAL | Age: 49
End: 2024-08-16
Payer: MEDICAID

## 2024-08-16 ENCOUNTER — ANESTHESIA (OUTPATIENT)
Dept: ENDOSCOPY | Facility: HOSPITAL | Age: 49
End: 2024-08-16
Payer: MEDICAID

## 2024-08-16 VITALS
SYSTOLIC BLOOD PRESSURE: 103 MMHG | HEIGHT: 59 IN | RESPIRATION RATE: 14 BRPM | HEART RATE: 75 BPM | WEIGHT: 174 LBS | OXYGEN SATURATION: 98 % | BODY MASS INDEX: 35.08 KG/M2 | DIASTOLIC BLOOD PRESSURE: 77 MMHG

## 2024-08-16 DIAGNOSIS — Z12.11 SCREENING FOR COLORECTAL CANCER: ICD-10-CM

## 2024-08-16 DIAGNOSIS — Z12.12 SCREENING FOR COLORECTAL CANCER: ICD-10-CM

## 2024-08-16 LAB
B-HCG UR QL: NEGATIVE
GLUCOSE BLDC GLUCOMTR-MCNC: 81 MG/DL (ref 70–99)

## 2024-08-16 PROCEDURE — 0DBK8ZX EXCISION OF ASCENDING COLON, VIA NATURAL OR ARTIFICIAL OPENING ENDOSCOPIC, DIAGNOSTIC: ICD-10-PCS | Performed by: INTERNAL MEDICINE

## 2024-08-16 PROCEDURE — 0DBN8ZX EXCISION OF SIGMOID COLON, VIA NATURAL OR ARTIFICIAL OPENING ENDOSCOPIC, DIAGNOSTIC: ICD-10-PCS | Performed by: INTERNAL MEDICINE

## 2024-08-16 PROCEDURE — 0DBP8ZX EXCISION OF RECTUM, VIA NATURAL OR ARTIFICIAL OPENING ENDOSCOPIC, DIAGNOSTIC: ICD-10-PCS | Performed by: INTERNAL MEDICINE

## 2024-08-16 PROCEDURE — 45380 COLONOSCOPY AND BIOPSY: CPT | Performed by: INTERNAL MEDICINE

## 2024-08-16 RX ORDER — NALOXONE HYDROCHLORIDE 0.4 MG/ML
0.08 INJECTION, SOLUTION INTRAMUSCULAR; INTRAVENOUS; SUBCUTANEOUS ONCE AS NEEDED
Status: DISCONTINUED | OUTPATIENT
Start: 2024-08-16 | End: 2024-08-16

## 2024-08-16 RX ORDER — SODIUM CHLORIDE, SODIUM LACTATE, POTASSIUM CHLORIDE, CALCIUM CHLORIDE 600; 310; 30; 20 MG/100ML; MG/100ML; MG/100ML; MG/100ML
INJECTION, SOLUTION INTRAVENOUS CONTINUOUS
Status: DISCONTINUED | OUTPATIENT
Start: 2024-08-16 | End: 2024-08-16

## 2024-08-16 RX ORDER — LIDOCAINE HYDROCHLORIDE 10 MG/ML
INJECTION, SOLUTION EPIDURAL; INFILTRATION; INTRACAUDAL; PERINEURAL AS NEEDED
Status: DISCONTINUED | OUTPATIENT
Start: 2024-08-16 | End: 2024-08-16 | Stop reason: SURG

## 2024-08-16 RX ADMIN — LIDOCAINE HYDROCHLORIDE 25 MG: 10 INJECTION, SOLUTION EPIDURAL; INFILTRATION; INTRACAUDAL; PERINEURAL at 14:38:00

## 2024-08-16 NOTE — DISCHARGE INSTRUCTIONS
Home Care Instructions for Colonoscopy with Sedation    Diet:  - Resume your regular diet as tolerated unless otherwise instructed.  - Start with light meals to minimize bloating.  - Do not drink alcohol today.    Medication:  - If you have questions about resuming your normal medications, please contact your Primary Care Physician.    Activities:  - Take it easy today. Do not return to work today.  - Do not drive today.  - Do not operate any machinery today (including kitchen equipment).    Colonoscopy:  - You may notice some rectal \"spotting\" (a little blood on the toilet tissue) for a day or two after the exam. This is normal.  - If you experience any rectal bleeding (not spotting), persistent tenderness or sharp severe abdominal pains, oral temperature over 100 degrees Fahrenheit, light-headedness or dizziness, or any other problems, contact your doctor.      **If unable to reach your doctor, please go to the [facility] Emergency Room**    - Your referring physician will receive a full report of your examination.  - If you do not hear from your doctor's office within two weeks of your biopsy, please call them for your results.    You may be able to see your laboratory results in [employer] between 4 and 7 business days.  In some cases, your physician may not have viewed the results before they are released to [employer].  If you have questions regarding your results contact the physician who ordered the test/exam by phone or via [employer] by choosing \"Ask a Medical Question.\"

## 2024-08-16 NOTE — OPERATIVE REPORT
Colonoscopy Report    Donna Robison     1975 Age 48 year old   PCP William Braun MD Endoscopist Nicholas Davis MD     Date of procedure: 24    Procedure: Colonoscopy w/ biopsy    Pre-operative diagnosis: colorectal cancer screening    Post-operative diagnosis: colon polyps, rectal polyps, diverticulosis, hemorrhoids    Sedation: monitored anesthesia care (MAC)    Consent: We discussed the risks/benefits and alternatives to this procedure, as well as the planned sedation. Informed consent was obtained from the patient after the risks of the procedure were discussed, including but not limited to bleeding, perforation, aspiration, infection, or possibility of a missed lesion as well as the risks of anesthesia including but not limited to cardiopulmonary complications. The patient signed informed consent and elected to proceed with Colonoscopy with intervention [i.e. Biopsy, control of bleeding, dilatation, polypectomy, endoscopic mucosal resection, etc.] as indicated.    Colonoscopy procedure: Once an adequate level of sedation was obtained a digital rectal exam was completed revealing normal tone and no masses palpated. Then the lubricated tip of the [id number] diagnostic video colonoscope was carefully inserted and advanced without difficulty to the cecum using the air insufflation technique (only Co2 was used for insufflation). The cecum was identified by localizing the trifold, the appendix and the ileocecal valve. Withdrawal was begun with thorough washing and careful examination of the colonic walls and folds. The ascending colon was viewed twice in the forward view. Photodocumentation was obtained. The bowel prep was good. Views of the colon were good with washing. Withdrawal time was 16 minutes.    Air was then withdrawn and the endoscope was removed. The patient tolerated the procedure well. There were no immediate postoperative complications. The patient’s vital signs were monitored  throughout the procedure and remained stable.    Estimated blood loss: insignificant    Specimens collected:  colon and rectal polyps    Complications: none     Colonoscopy findings:  Terminal ileum: normal mucosa    There were scattered diverticulosis throughout the colon.    Cecum: normal mucosa and vascular pattern    Ascending colon: there was a diminutive polyp removed by cold biopsy forceps. Otherwise, normal mucosa and vascular pattern    Transverse colon: normal mucosa and vascular pattern    Descending colon: normal mucosa and vascular pattern    Sigmoid colon: there was a diminutive polyp removed by cold biopsy forceps. Otherwise, normal mucosa and vascular pattern    Rectum: retroflexed view showed small non-bleeding hemorrhoids. There were 3 diminutive polyps removed by cold biopsy forceps. Otherwise, normal mucosa and vascular pattern.    Impression:  1. 5 diminutive polyps removed  2. Colon diverticulosis  3. Hemorrhoids  4. Otherwise, unremarkable colonoscopy    Recommend:  1. Await pathology.   2. Repeat colonoscopy interval pending final pathology results. If new signs or symptoms develop, procedure may need to be repeated sooner.   3. Continue your current medications  4. Increase fiber in the diet  5. Follow up with your primary care physician on a routine basis    >>>If biopsies were performed and you have not received your pathology results either by phone or letter within 2 weeks, please call our office at [phone].    Nicholas Davis MD  [facility] - Gastroenterology  8/16/2024

## 2024-08-16 NOTE — ANESTHESIA POSTPROCEDURE EVALUATION
Patient: Donna Robison    Procedure Summary       Date: 08/16/24 Room / Location: [facility] ENDOSCOPY 01 / [facility] ENDOSCOPY    Anesthesia Start: 1433 Anesthesia Stop:     Procedure: COLONOSCOPY Diagnosis:       Screening for colorectal cancer      (polyps, diverticulosis, hemorrhoids)    Surgeons: Nicholas Davis MD Anesthesiologist: Esther Morelos CRNA    Anesthesia Type: MAC, general ASA Status: 2            Anesthesia Type: MAC, general    Vitals Value Taken Time   BP 97/62 08/16/24 1503   Temp  08/16/24 1504   Pulse 82 08/16/24 1503   Resp 18 08/16/24 1503   SpO2 100 % 08/16/24 1503       [facility] AN Post Evaluation:   Patient Evaluated in PACU  Patient Participation: complete - patient participated  Level of Consciousness: responsive to verbal stimuli  Pain Score: 0  Pain Management: adequate  Airway Patency:patent  Yes    Nausea/Vomiting: none  Cardiovascular Status: acceptable  Respiratory Status: room air  Postoperative Hydration acceptable      Giovnana Grullon CRNA  8/16/2024 3:04 PM

## 2024-08-16 NOTE — ANESTHESIA PREPROCEDURE EVALUATION
Anesthesia PreOp Note    HPI:     Donna Robison is a 48 year old female who presents for preoperative consultation requested by: Nicholas Davis MD    Date of Surgery: 2024    Procedure(s):  COLONOSCOPY  Indication: Screening for colorectal cancer    Relevant Problems   No relevant active problems       NPO:  Last Liquid Consumption Date: 24  Last Liquid Consumption Time: 1100  Last Solid Consumption Date: 08/15/24  Last Solid Consumption Time: 1000  Last Liquid Consumption Date: 24          History Review:  There are no problems to display for this patient.      Past Medical History:    Diabetes (HCC)    Diabetes mellitus (HCC)       Past Surgical History:   Procedure Laterality Date    Appendectomy            , ,     Colonoscopy  2024    D & c      Hernia surgery      umbilical    Laparoscopic salpingostomy         Medications Prior to Admission   Medication Sig Dispense Refill Last Dose    dapagliflozin (FARXIGA) 10 MG Oral Tab Take 1 tablet (10 mg total) by mouth daily. 90 tablet 1 2024    atorvastatin 20 MG Oral Tab Take 1 tablet (20 mg total) by mouth nightly. 90 tablet 1 2024    metFORMIN HCl 1000 MG Oral Tab Take 1 tablet (1,000 mg total) by mouth 2 (two) times daily with meals. 180 tablet 1 2024    Lancets Does not apply Misc Use with glucometer, check BG 2 times daily. 100 each 0     Blood Gluc Meter Disp-Strips Does not apply Device Use with glucometer to check BG 2 times daily. 100 each 0     Blood Glucose Monitoring Suppl Does not apply Device Use glucometer to check blood glucose in am, fasting and 2 hrs after meal. 1 each 0      Current Facility-Administered Medications Ordered in Epic   Medication Dose Route Frequency Provider Last Rate Last Admin    lactated ringers infusion   Intravenous Continuous Nicholas Davis MD 20 mL/hr at 24 1426 New Bag at 24 1426     No current [facility]-ordered outpatient medications on file.        No Known Allergies    Family History   Problem Relation Age of Onset    No Known Problems Father     Depression Mother     Hypertension Mother     No Known Problems Son     Asthma Son     No Known Problems Sister     No Known Problems Brother     Breast Cancer Neg     Uterine Cancer Neg     Ovarian Cancer Neg      Social History     Socioeconomic History    Marital status:    Tobacco Use    Smoking status: Never    Smokeless tobacco: Never   Vaping Use    Vaping status: Never Used   Substance and Sexual Activity    Alcohol use: Yes     Comment: social    Drug use: Never    Sexual activity: Yes   Other Topics Concern    Blood Transfusions No       Available pre-op labs reviewed.  Lab Results   Component Value Date    WBC 7.5 07/16/2024    RBC 4.67 07/16/2024    HGB 12.9 07/16/2024    HCT 40.4 07/16/2024    MCV 86.5 07/16/2024    MCH 27.6 07/16/2024    MCHC 31.9 07/16/2024    RDW 12.9 07/16/2024    .0 07/16/2024    URINEPREG Negative 08/16/2024     Lab Results   Component Value Date     07/16/2024    K 3.6 07/16/2024     07/16/2024    CO2 28.0 07/16/2024    BUN 12 07/16/2024    CREATSERUM 0.58 07/16/2024     (H) 07/16/2024    CA 9.4 07/16/2024          Vital Signs:  Body mass index is 35.14 kg/m².   height is 1.499 m (4' 11\") and weight is 78.9 kg (174 lb). Her blood pressure is 107/68 and her pulse is 73. Her respiration is 8 (abnormal) and oxygen saturation is 97%.   Vitals:    08/10/24 1548 08/10/24 1549 08/16/24 1412 08/16/24 1415   BP:    107/68   Pulse:    73   Resp:    (!) 8   SpO2:    97%   Weight: 82.1 kg (181 lb) 78.9 kg (174 lb) 78.9 kg (174 lb)    Height: 1.499 m (4' 11\")  1.499 m (4' 11\")         Anesthesia Evaluation     Patient summary reviewed and Nursing notes reviewed    No history of anesthetic complications   Airway   Mallampati: II  TM distance: >3 FB  Neck ROM: full  Dental - Dentition appears grossly intact     Pulmonary - negative ROS and normal exam    Cardiovascular - negative ROS and normal exam  Exercise tolerance: good    Neuro/Psych - negative ROS     GI/Hepatic/Renal    (+) bowel prep    Endo/Other    (+) diabetes mellitus type 2 well controlled  Abdominal  - normal exam                 Anesthesia Plan:   ASA:  2  Plan:   MAC and general  Informed Consent Plan and Risks Discussed With:  Patient and   Discussed plan with:  CRNA and surgeon      I have informed Donna Robison and/or legal guardian or family member of the nature of the anesthetic plan, benefits, risks including possible dental damage if relevant, major complications, and any alternative forms of anesthetic management.   All of the patient's questions were answered to the best of my ability. The patient desires the anesthetic management as planned.  Esther Morelos CRNA  8/16/2024 2:30 PM  Present on Admission:  **None**

## 2024-08-21 NOTE — PROGRESS NOTES
GI staff: please place recall in for colonoscopy in 10 years     Thanks    Nicholas Davis MD  [facility] - Gastroenterology  8/21/2024  12:42 PM

## 2024-08-22 ENCOUNTER — TELEPHONE (OUTPATIENT)
Facility: CLINIC | Age: 49
End: 2024-08-22

## 2024-08-22 NOTE — TELEPHONE ENCOUNTER
Health maintenance updated. Last colonoscopy done 8/16/24. 10 year recall placed into [facility], next due on 08/2034 per Dr. Davis.

## 2024-08-22 NOTE — TELEPHONE ENCOUNTER
----- Message from Nicholas Davis sent at 8/21/2024 12:42 PM CDT -----  GI staff: please place recall in for colonoscopy in 10 years     Thanks    Nicholas Davis MD  [facility]  8/21/2024  12:42 PM

## 2024-08-28 ENCOUNTER — OFFICE VISIT (OUTPATIENT)
Dept: INTERNAL MEDICINE CLINIC | Facility: CLINIC | Age: 49
End: 2024-08-28
Payer: MEDICAID

## 2024-08-28 VITALS
OXYGEN SATURATION: 96 % | WEIGHT: 170 LBS | HEART RATE: 80 BPM | SYSTOLIC BLOOD PRESSURE: 110 MMHG | DIASTOLIC BLOOD PRESSURE: 68 MMHG | BODY MASS INDEX: 34.27 KG/M2 | HEIGHT: 59 IN

## 2024-08-28 DIAGNOSIS — E11.65 UNCONTROLLED TYPE 2 DIABETES MELLITUS WITH HYPERGLYCEMIA (HCC): Primary | ICD-10-CM

## 2024-08-28 PROBLEM — K35.80 ACUTE APPENDICITIS: Status: ACTIVE | Noted: 2021-02-19

## 2024-08-28 PROCEDURE — 99215 OFFICE O/P EST HI 40 MIN: CPT | Performed by: INTERNAL MEDICINE

## 2024-08-28 NOTE — PROGRESS NOTES
[facility] part of [facility]  Return Patient Progress Note      HPI:     Chief Complaint   Patient presents with    Follow - Up     6 week glucose f/u  Last A1c 10.1 - 07/2024       Donna Robison is a 48 year old female presenting for:    Has a significant  has a past medical history of Diabetes (HCC) and Diabetes mellitus (HCC).    Here for follow up.  Uncontrolled DM II.      Last A1C 10.1.  On Farxiga 10 mg and Metformin 1000 BID.  Since starting treatment reports home glucose levels in the 110-130 range.      Labs:   CMP:  Lab Results   Component Value Date/Time     07/16/2024 03:45 PM    K 3.6 07/16/2024 03:45 PM     07/16/2024 03:45 PM    CO2 28.0 07/16/2024 03:45 PM    CREATSERUM 0.58 07/16/2024 03:45 PM    CA 9.4 07/16/2024 03:45 PM     (H) 07/16/2024 03:45 PM    TP 7.0 07/16/2024 03:45 PM    ALB 4.2 07/16/2024 03:45 PM    ALKPHO 110 (H) 07/16/2024 03:45 PM    AST 33 07/16/2024 03:45 PM    ALT 49 07/16/2024 03:45 PM    BILT 0.2 (L) 07/16/2024 03:45 PM    TSH 1.435 01/31/2024 02:46 PM    T4F 0.9 01/31/2024 02:46 PM        CBC:  Lab Results   Component Value Date    WBC 7.5 07/16/2024    HGB 12.9 07/16/2024    HCT 40.4 07/16/2024    .0 07/16/2024    NEPERCENT 46.3 07/16/2024    LYPERCENT 43.9 07/16/2024    MOPERCENT 5.9 07/16/2024    EOPERCENT 3.5 07/16/2024    BAPERCENT 0.3 07/16/2024    NE 3.48 07/16/2024    LYMABS 3.29 07/16/2024    MOABSO 0.44 07/16/2024    EOABSO 0.26 07/16/2024    BAABSO 0.02 07/16/2024          Hemoglobin A1C, Microalbumin  Lab Results   Component Value Date/Time    A1C 10.1 (H) 07/16/2024 03:45 PM        Lipid panel  Lab Results   Component Value Date/Time    CHOLEST 257 (H) 01/31/2024 02:46 PM    HDL 50 01/31/2024 02:46 PM    TRIG 119 01/31/2024 02:46 PM     (H) 01/31/2024 02:46 PM    NONHDLC 207 (H) 01/31/2024 02:46 PM        Medications:  Current Outpatient Medications   Medication Sig Dispense Refill    dapagliflozin (FARXIGA) 10 MG  Oral Tab Take 1 tablet (10 mg total) by mouth daily. 90 tablet 1    atorvastatin 20 MG Oral Tab Take 1 tablet (20 mg total) by mouth nightly. 90 tablet 1    metFORMIN HCl 1000 MG Oral Tab Take 1 tablet (1,000 mg total) by mouth 2 (two) times daily with meals. 180 tablet 1    Lancets Does not apply Misc Use with glucometer, check BG 2 times daily. 100 each 0    Blood Gluc Meter Disp-Strips Does not apply Device Use with glucometer to check BG 2 times daily. 100 each 0    Blood Glucose Monitoring Suppl Does not apply Device Use glucometer to check blood glucose in am, fasting and 2 hrs after meal. 1 each 0      PMH:  Past Medical History:    Diabetes (HCC)    Diabetes mellitus (HCC)         PSH:  Past Surgical History:   Procedure Laterality Date    Appendectomy            , ,     Colonoscopy N/A 2024    Procedure: COLONOSCOPY;  Surgeon: Nicholas Davis MD;  Location: [facility]    Colonoscopy & polypectomy  2024    D & c      Hernia surgery      umbilical    Laparoscopic salpingostomy         Allergies:  No Known Allergies   Social History:  Social History     Socioeconomic History    Marital status:    Tobacco Use    Smoking status: Never    Smokeless tobacco: Never   Vaping Use    Vaping status: Never Used   Substance and Sexual Activity    Alcohol use: Yes     Comment: social    Drug use: Never    Sexual activity: Yes   Other Topics Concern    Blood Transfusions No     Social Determinants of Health     Financial Resource Strain: Not on File (2023)    Received from [facility]    Financial Resource Strain     Financial Resource Strain: 0   Food Insecurity: Not on File (2023)    Received from [facility]    Food Insecurity     Food: 0   Transportation Needs: Not on File (2023)    Received from [facility]    Transportation Needs     Transportation: 0   Physical Activity: Not on File (2023)    Received from [facility]    Physical Activity      Physical Activity: 0   Stress: Not on File (2023)    Received from [facility]    Stress     Stress: 0   Social Connections: Not on File (2023)    Received from [facility]    Social Connections     Social Connections and Isolation: 0   Housing Stability: Not on File (2023)    Received from [facility]    Housing Stability     Housin      Family History:  Family History   Problem Relation Age of Onset    No Known Problems Father     Depression Mother     Hypertension Mother     No Known Problems Son     Asthma Son     No Known Problems Sister     No Known Problems Brother     Breast Cancer Neg     Uterine Cancer Neg     Ovarian Cancer Neg               REVIEW OF SYSTEMS:   Review of Systems   Constitutional:  Negative for chills, fatigue, fever and unexpected weight change.   HENT:  Negative for congestion, ear pain, hearing loss, rhinorrhea, sinus pain and sore throat.    Eyes:  Negative for pain, redness and visual disturbance.   Respiratory:  Negative for apnea, cough, chest tightness, shortness of breath and wheezing.    Cardiovascular:  Negative for chest pain, palpitations and leg swelling.   Gastrointestinal:  Negative for abdominal distention, abdominal pain, blood in stool, constipation and nausea.   Endocrine: Negative for cold intolerance, heat intolerance and polyuria.   Genitourinary:  Negative for dysuria, hematuria and urgency.   Musculoskeletal:  Negative for arthralgias, back pain, gait problem, joint swelling, myalgias and neck pain.   Skin:  Negative for rash and wound.   Allergic/Immunologic: Negative for food allergies and immunocompromised state.   Neurological:  Negative for dizziness, seizures, facial asymmetry, speech difficulty, weakness, light-headedness, numbness and headaches.   Hematological:  Negative for adenopathy. Does not bruise/bleed easily.   Psychiatric/Behavioral:  Negative for behavioral problems, sleep disturbance and suicidal ideas. The patient is not  nervous/anxious.             PHYSICAL EXAM:   /68   Pulse 80   Ht 4' 11\" (1.499 m)   Wt 170 lb (77.1 kg)   LMP 11/23/2023   SpO2 96%   BMI 34.34 kg/m²  Estimated body mass index is 34.34 kg/m² as calculated from the following:    Height as of this encounter: 4' 11\" (1.499 m).    Weight as of this encounter: 170 lb (77.1 kg).     Wt Readings from Last 3 Encounters:   08/28/24 170 lb (77.1 kg)   08/16/24 174 lb (78.9 kg)   07/16/24 176 lb (79.8 kg)       Physical Exam  Vitals reviewed.   Constitutional:       General: She is not in acute distress.     Appearance: She is well-developed.   HENT:      Head: Normocephalic and atraumatic.   Eyes:      Conjunctiva/sclera: Conjunctivae normal.      Pupils: Pupils are equal, round, and reactive to light.   Neck:      Thyroid: No thyromegaly.   Cardiovascular:      Rate and Rhythm: Normal rate and regular rhythm.      Heart sounds: Normal heart sounds, S1 normal and S2 normal. No murmur heard.     No friction rub. No gallop.   Pulmonary:      Effort: Pulmonary effort is normal. No respiratory distress.      Breath sounds: Normal breath sounds. No wheezing or rales.   Chest:      Chest wall: No tenderness.   Abdominal:      General: Bowel sounds are normal. There is no distension.      Palpations: Abdomen is soft. There is no mass.      Tenderness: There is no abdominal tenderness. There is no guarding or rebound.   Musculoskeletal:         General: No tenderness. Normal range of motion.      Cervical back: Normal range of motion.   Lymphadenopathy:      Cervical: No cervical adenopathy.   Skin:     General: Skin is warm.      Findings: No erythema or rash.   Neurological:      Mental Status: She is alert and oriented to person, place, and time.      Cranial Nerves: No cranial nerve deficit.      Deep Tendon Reflexes: Reflexes are normal and symmetric.   Psychiatric:         Behavior: Behavior normal.         Thought Content: Thought content normal.          Judgment: Judgment normal.         Bilateral barefoot skin diabetic exam is normal, visualized feet and the appearance is normal.  Bilateral monofilament/sensation of both feet is normal.  Pulsation pedal pulse exam of both lower legs/feet is normal as well.          ASSESSMENT AND PLAN:   Patient is a 48 year old female who presents primarily presents for:    (E11.65) Uncontrolled type 2 diabetes mellitus with hyperglycemia (HCC)  (primary encounter diagnosis)  Plan: Continue farxiga and metformin.     Extensive time spent discussing the risks of uncontrolled diabetes.  Recommendations for control of diabetes including appropriate monitoring of blood glucose, follow-up with specialists (ophthalmologist and podiatrist) and dietary recommendations discussed.      Dietary recommendations include  -  Carbohydrates: Focus on consuming complex carbohydrates like whole grains, legumes, fruits, and vegetables. These foods provide fiber and have a slower impact on blood sugar levels compared to refined carbohydrates.    -  Fiber: Include high-fiber foods such as whole grains, vegetables, fruits, and legumes in your diet. Fiber helps regulate blood sugar levels and promotes overall digestive health.    -  Protein: Incorporate lean sources of protein like poultry, fish, tofu, beans, and low-fat dairy products. Protein helps with satiety and can help stabilize blood sugar levels.    -  Healthy fats: Choose sources of healthy fats like avocados, nuts, seeds, and olive oil. Limit saturated and trans fats found in fried foods, fatty meats, and processed snacks.    - Portion control: Pay attention to portion sizes to manage calorie intake and prevent blood sugar spikes. Consider working with a registered dietitian to determine appropriate portion sizes for your specific needs.    - Regular meals and snacks: Aim for regular meal times and spacing meals evenly throughout the day. This can help maintain stable blood sugar  levels.    - Limit sugary foods and beverages: Minimize the consumption of sugary drinks, desserts, and processed snacks as they can cause rapid blood sugar spikes.      Discussed glucose targets:       -  Before a meal 80 to 130 mg/dL       -  Two hours after the start of a meal: Less than 180 mg/dL                  Health Maintenance:    Health Maintenance   Topic Date Due    Annual Physical  Never done    Colorectal Cancer Screening  Never done    Pneumococcal Vaccine: Birth to 64yrs (1 of 2 - PCV) Never done    DTaP,Tdap,and Td Vaccines (1 - Tdap) Never done    COVID-19 Vaccine (1 - 2023-24 season) Never done    Zoster Vaccines (1 of 2) Never done    Annual Depression Screening  Never done    Diabetes Care A1C  07/31/2024    Influenza Vaccine (1) 10/01/2024    Diabetes Care Foot Exam  01/31/2025    Diabetes Care: GFR  01/31/2025    Diabetes Care: Microalb/Creat Ratio  01/31/2025    Mammogram  03/09/2025    Diabetes Care Dilated Eye Exam  04/01/2025    Pap Smear  02/28/2027         Meds & Refills for this Visit:  Requested Prescriptions      No prescriptions requested or ordered in this encounter       No orders of the defined types were placed in this encounter.      Imaging & Consults:  None          No follow-ups on file.  Important follow up notes/labs for next visit      Patient indicates understanding of the above recommendations and agrees to the above plan.  Reasurrance and education provided. All questions answered.    Notified to call with any questions, complications, allergies, or worsening or changing symptoms as well as any side effects or complications from the treatments .  Red flags/ ER precautions discussed.    If diagnostic labs or imaging ordered advised patient to contact my office for results  24-48 hours after completion    This note was dictated using dragon speech recognition transcription software.  Typographical and transcription errors may be present.  Please call if any questions.              William Braun MD  [facility]

## 2024-09-17 RX ORDER — BLOOD SUGAR DIAGNOSTIC
1 STRIP MISCELLANEOUS 2 TIMES DAILY
Qty: 100 STRIP | Refills: 0 | Status: SHIPPED | OUTPATIENT
Start: 2024-09-17

## 2024-10-16 ENCOUNTER — OFFICE VISIT (OUTPATIENT)
Dept: INTERNAL MEDICINE CLINIC | Facility: CLINIC | Age: 49
End: 2024-10-16
Payer: MEDICAID

## 2024-10-16 VITALS
BODY MASS INDEX: 34.88 KG/M2 | SYSTOLIC BLOOD PRESSURE: 120 MMHG | HEIGHT: 59 IN | WEIGHT: 173 LBS | HEART RATE: 87 BPM | DIASTOLIC BLOOD PRESSURE: 50 MMHG | OXYGEN SATURATION: 96 %

## 2024-10-16 DIAGNOSIS — E11.65 UNCONTROLLED TYPE 2 DIABETES MELLITUS WITH HYPERGLYCEMIA (HCC): Primary | ICD-10-CM

## 2024-10-16 DIAGNOSIS — I83.90 VARICOSE VEINS: ICD-10-CM

## 2024-10-16 LAB
CARTRIDGE EXPIRATION DATE: ABNORMAL DATE
HEMOGLOBIN A1C: 7.2 % (ref 4.3–5.6)

## 2024-10-16 RX ORDER — TERBINAFINE HYDROCHLORIDE 250 MG/1
250 TABLET ORAL DAILY
Qty: 30 TABLET | Refills: 0 | Status: SHIPPED | OUTPATIENT
Start: 2024-10-16

## 2024-10-16 RX ORDER — SEMAGLUTIDE 0.68 MG/ML
0.25 INJECTION, SOLUTION SUBCUTANEOUS WEEKLY
Qty: 1 EACH | Refills: 0 | Status: SHIPPED | OUTPATIENT
Start: 2024-10-16

## 2024-10-16 RX ORDER — SEMAGLUTIDE 0.68 MG/ML
0.5 INJECTION, SOLUTION SUBCUTANEOUS WEEKLY
Qty: 1 EACH | Refills: 1 | Status: SHIPPED | OUTPATIENT
Start: 2024-10-16

## 2024-10-16 NOTE — PROGRESS NOTES
[facility] part of [facility]  Return Patient Progress Note      HPI:     Chief Complaint   Patient presents with    Follow - Up       Donna Robison is a 49 year old female presenting for:    Has a significant  has a past medical history of Diabetes (HCC) and Diabetes mellitus (HCC).    Here for follow up.  Uncontrolled DM II.      Last A1C 10.1.  On Farxiga 10 mg and Metformin 1000 BID.  Since starting treatment reports home glucose levels in the 110-130 range.          Labs:   CMP:  Lab Results   Component Value Date/Time     07/16/2024 03:45 PM    K 3.6 07/16/2024 03:45 PM     07/16/2024 03:45 PM    CO2 28.0 07/16/2024 03:45 PM    CREATSERUM 0.58 07/16/2024 03:45 PM    CA 9.4 07/16/2024 03:45 PM     (H) 07/16/2024 03:45 PM    TP 7.0 07/16/2024 03:45 PM    ALB 4.2 07/16/2024 03:45 PM    ALKPHO 110 (H) 07/16/2024 03:45 PM    AST 33 07/16/2024 03:45 PM    ALT 49 07/16/2024 03:45 PM    BILT 0.2 (L) 07/16/2024 03:45 PM    TSH 1.435 01/31/2024 02:46 PM    T4F 0.9 01/31/2024 02:46 PM        CBC:  Lab Results   Component Value Date    WBC 7.5 07/16/2024    HGB 12.9 07/16/2024    HCT 40.4 07/16/2024    .0 07/16/2024    NEPERCENT 46.3 07/16/2024    LYPERCENT 43.9 07/16/2024    MOPERCENT 5.9 07/16/2024    EOPERCENT 3.5 07/16/2024    BAPERCENT 0.3 07/16/2024    NE 3.48 07/16/2024    LYMABS 3.29 07/16/2024    MOABSO 0.44 07/16/2024    EOABSO 0.26 07/16/2024    BAABSO 0.02 07/16/2024          Hemoglobin A1C, Microalbumin  Lab Results   Component Value Date/Time    A1C 7.2 (A) 10/16/2024 02:18 PM        Lipid panel  Lab Results   Component Value Date/Time    CHOLEST 257 (H) 01/31/2024 02:46 PM    HDL 50 01/31/2024 02:46 PM    TRIG 119 01/31/2024 02:46 PM     (H) 01/31/2024 02:46 PM    NONHDLC 207 (H) 01/31/2024 02:46 PM        Medications:  Current Outpatient Medications   Medication Sig Dispense Refill    ONETOUCH VERIO In Vitro Strip TEST TWICE DAILY 100 strip 0     dapagliflozin (FARXIGA) 10 MG Oral Tab Take 1 tablet (10 mg total) by mouth daily. 90 tablet 1    atorvastatin 20 MG Oral Tab Take 1 tablet (20 mg total) by mouth nightly. 90 tablet 1    metFORMIN HCl 1000 MG Oral Tab Take 1 tablet (1,000 mg total) by mouth 2 (two) times daily with meals. 180 tablet 1    Lancets Does not apply Misc Use with glucometer, check BG 2 times daily. 100 each 0    Blood Gluc Meter Disp-Strips Does not apply Device Use with glucometer to check BG 2 times daily. 100 each 0    Blood Glucose Monitoring Suppl Does not apply Device Use glucometer to check blood glucose in am, fasting and 2 hrs after meal. 1 each 0      PMH:  Past Medical History:    Diabetes (HCC)    Diabetes mellitus (HCC)         PSH:  Past Surgical History:   Procedure Laterality Date    Appendectomy            , ,     Colonoscopy N/A 2024    Procedure: COLONOSCOPY;  Surgeon: Nicholas Davis MD;  Location: [facility] ENDOSCOPY    Colonoscopy & polypectomy  2024    D & c      Hernia surgery      umbilical    Laparoscopic salpingostomy         Allergies:  No Known Allergies   Social History:  Social History     Socioeconomic History    Marital status:    Tobacco Use    Smoking status: Never    Smokeless tobacco: Never   Vaping Use    Vaping status: Never Used   Substance and Sexual Activity    Alcohol use: Yes     Comment: social    Drug use: Never    Sexual activity: Yes   Other Topics Concern    Blood Transfusions No     Social Drivers of Health     Financial Resource Strain: Not on File (2023)    Received from [facility]    Financial Resource Strain     Financial Resource Strain: 0   Food Insecurity: Not on File (2023)    Received from [facility]    Food Insecurity     Food: 0   Transportation Needs: Not on File (2023)    Received from [facility]    Transportation Needs     Transportation: 0   Physical Activity: Not on File (2023)    Received from [facility]     Physical Activity     Physical Activity: 0   Stress: Not on File (2023)    Received from [facility]    Stress     Stress: 0   Social Connections: Not on File (2023)    Received from [facility]    Social Connections     Social Connections and Isolation: 0   Housing Stability: Not on File (2023)    Received from [facility]    Housing Stability     Housin      Family History:  Family History   Problem Relation Age of Onset    No Known Problems Father     Depression Mother     Hypertension Mother     No Known Problems Son     Asthma Son     No Known Problems Sister     No Known Problems Brother     Breast Cancer Neg     Uterine Cancer Neg     Ovarian Cancer Neg               REVIEW OF SYSTEMS:   Review of Systems   Constitutional:  Negative for chills, fatigue, fever and unexpected weight change.   HENT:  Negative for congestion, ear pain, hearing loss, rhinorrhea, sinus pain and sore throat.    Eyes:  Negative for pain, redness and visual disturbance.   Respiratory:  Negative for apnea, cough, chest tightness, shortness of breath and wheezing.    Cardiovascular:  Negative for chest pain, palpitations and leg swelling.   Gastrointestinal:  Negative for abdominal distention, abdominal pain, blood in stool, constipation and nausea.   Endocrine: Negative for cold intolerance, heat intolerance and polyuria.   Genitourinary:  Negative for dysuria, hematuria and urgency.   Musculoskeletal:  Negative for arthralgias, back pain, gait problem, joint swelling, myalgias and neck pain.   Skin:  Negative for rash and wound.   Allergic/Immunologic: Negative for food allergies and immunocompromised state.   Neurological:  Negative for dizziness, seizures, facial asymmetry, speech difficulty, weakness, light-headedness, numbness and headaches.   Hematological:  Negative for adenopathy. Does not bruise/bleed easily.   Psychiatric/Behavioral:  Negative for behavioral problems, sleep disturbance and suicidal  ideas. The patient is not nervous/anxious.             PHYSICAL EXAM:   /50   Pulse 87   Ht 4' 11\" (1.499 m)   Wt 173 lb (78.5 kg)   LMP 11/23/2023   SpO2 96%   BMI 34.94 kg/m²  Estimated body mass index is 34.94 kg/m² as calculated from the following:    Height as of this encounter: 4' 11\" (1.499 m).    Weight as of this encounter: 173 lb (78.5 kg).     Wt Readings from Last 3 Encounters:   10/16/24 173 lb (78.5 kg)   08/28/24 170 lb (77.1 kg)   08/16/24 174 lb (78.9 kg)       Physical Exam  Vitals reviewed.   Constitutional:       General: She is not in acute distress.     Appearance: She is well-developed.   HENT:      Head: Normocephalic and atraumatic.   Eyes:      Conjunctiva/sclera: Conjunctivae normal.      Pupils: Pupils are equal, round, and reactive to light.   Neck:      Thyroid: No thyromegaly.   Cardiovascular:      Rate and Rhythm: Normal rate and regular rhythm.      Heart sounds: Normal heart sounds, S1 normal and S2 normal. No murmur heard.     No friction rub. No gallop.   Pulmonary:      Effort: Pulmonary effort is normal. No respiratory distress.      Breath sounds: Normal breath sounds. No wheezing or rales.   Chest:      Chest wall: No tenderness.   Abdominal:      General: Bowel sounds are normal. There is no distension.      Palpations: Abdomen is soft. There is no mass.      Tenderness: There is no abdominal tenderness. There is no guarding or rebound.   Musculoskeletal:         General: No tenderness. Normal range of motion.      Cervical back: Normal range of motion.   Lymphadenopathy:      Cervical: No cervical adenopathy.   Skin:     General: Skin is warm.      Findings: No erythema or rash.   Neurological:      Mental Status: She is alert and oriented to person, place, and time.      Cranial Nerves: No cranial nerve deficit.      Deep Tendon Reflexes: Reflexes are normal and symmetric.   Psychiatric:         Behavior: Behavior normal.         Thought Content: Thought  content normal.         Judgment: Judgment normal.         Bilateral barefoot skin diabetic exam is normal, visualized feet and the appearance is normal.  Bilateral monofilament/sensation of both feet is normal.  Pulsation pedal pulse exam of both lower legs/feet is normal as well.          ASSESSMENT AND PLAN:   Patient is a 49 year old female who presents primarily presents for:    (E11.65) Uncontrolled type 2 diabetes mellitus with hyperglycemia (HCC)  (primary encounter diagnosis)  Plan: POC Glycohemoglobin [64284]       Now considered controlled.  ContinueFarxiga.  Will attempt to prescribe ozempic to assist with weight loss.      (I83.90) Varicose veins  Plan: Vascular Surgery - In Network, DME - External                          Health Maintenance:    Health Maintenance   Topic Date Due    Annual Physical  Never done    Colorectal Cancer Screening  Never done    Pneumococcal Vaccine: Birth to 64yrs (1 of 2 - PCV) Never done    DTaP,Tdap,and Td Vaccines (1 - Tdap) Never done    COVID-19 Vaccine (1 - 2023-24 season) Never done    Zoster Vaccines (1 of 2) Never done    Annual Depression Screening  Never done    Diabetes Care A1C  07/31/2024    Influenza Vaccine (1) 10/01/2024    Diabetes Care Foot Exam  01/31/2025    Diabetes Care: GFR  01/31/2025    Diabetes Care: Microalb/Creat Ratio  01/31/2025    Mammogram  03/09/2025    Diabetes Care Dilated Eye Exam  04/01/2025    Pap Smear  02/28/2027         Meds & Refills for this Visit:  Requested Prescriptions      No prescriptions requested or ordered in this encounter       Orders Placed This Encounter   Procedures    POC Glycohemoglobin [01968]    Fluzone trivalent vaccine, PF 0.5mL, 6mo+ (35987)       Imaging & Consults:  INFLUENZA VACCINE, TRI, PRESERV FREE, 0.5 ML          No follow-ups on file.  Important follow up notes/labs for next visit      Patient indicates understanding of the above recommendations and agrees to the above plan.  Reasurrance and education  provided. All questions answered.    Notified to call with any questions, complications, allergies, or worsening or changing symptoms as well as any side effects or complications from the treatments .  Red flags/ ER precautions discussed.    If diagnostic labs or imaging ordered advised patient to contact my office for results  24-48 hours after completion    This note was dictated using dragon speech recognition transcription software.  Typographical and transcription errors may be present.  Please call if any questions.             William Braun MD  [facility]

## 2024-10-23 ENCOUNTER — TELEPHONE (OUTPATIENT)
Dept: INTERNAL MEDICINE CLINIC | Facility: CLINIC | Age: 49
End: 2024-10-23

## 2024-10-24 NOTE — TELEPHONE ENCOUNTER
Faxed request received from [employer] to complete a PA submission for the ordered Ozempic rx .    Prior auth form completed and  medical records faxed to [employer] medicaid PA team.

## 2024-12-09 RX ORDER — GLUCOSAMINE HCL/CHONDROITIN SU 500-400 MG
CAPSULE ORAL
Refills: 0 | OUTPATIENT
Start: 2024-12-09

## 2024-12-09 RX ORDER — LANCETS 33 GAUGE
1 EACH MISCELLANEOUS 2 TIMES DAILY
Qty: 100 EACH | Refills: 0 | Status: SHIPPED | OUTPATIENT
Start: 2024-12-09

## 2025-01-15 ENCOUNTER — TELEPHONE (OUTPATIENT)
Dept: INTERNAL MEDICINE CLINIC | Facility: CLINIC | Age: 50
End: 2025-01-15

## 2025-01-15 DIAGNOSIS — E11.65 UNCONTROLLED TYPE 2 DIABETES MELLITUS WITH HYPERGLYCEMIA (HCC): Primary | ICD-10-CM

## 2025-01-15 RX ORDER — BLOOD-GLUCOSE METER
1 EACH MISCELLANEOUS 2 TIMES DAILY
Qty: 1 KIT | Refills: 0 | Status: SHIPPED | OUTPATIENT
Start: 2025-01-15 | End: 2026-01-15

## 2025-01-15 NOTE — TELEPHONE ENCOUNTER
Pt is calling requesting a new device to check blood sugar, and also refills on lancets and alcohol wipes. Pt was told that lancets were sent on 12/09 and lancets are not cover by her insurance.     Pt also requested a note for work that she received her flu vaccine. Pt states that she showed them her records of immunization but was not accept and was told needs a letter from pcp.       Please call and advise

## 2025-03-10 RX ORDER — DULAGLUTIDE 1.5 MG/.5ML
INJECTION, SOLUTION SUBCUTANEOUS
Qty: 2 ML | Refills: 0 | Status: SHIPPED | OUTPATIENT
Start: 2025-03-10

## 2025-04-17 NOTE — TELEPHONE ENCOUNTER
Patient is calling in requesting refill for Trulicity. Please call when refill has been sent, if appropriate.

## 2025-04-21 RX ORDER — DULAGLUTIDE 1.5 MG/.5ML
1 INJECTION, SOLUTION SUBCUTANEOUS WEEKLY
Qty: 2 ML | Refills: 0 | Status: SHIPPED | OUTPATIENT
Start: 2025-04-21

## 2025-04-21 RX ORDER — DULAGLUTIDE 0.75 MG/.5ML
INJECTION, SOLUTION SUBCUTANEOUS
Qty: 2 ML | Refills: 0 | OUTPATIENT
Start: 2025-04-21

## 2025-04-21 NOTE — TELEPHONE ENCOUNTER
Patient was requesting an old prescription, patient is currently using Trulicity 1.5mg/0.5ML not 0.75mg/ 0.5ML. this message was routed to Dr. Braun for review.     Dr. Holm notes 10/23//2024:  trulicity 0.75 mg weekly and 2nd month increase to 1.5 mg weekly.  Ok.

## 2025-06-05 RX ORDER — DULAGLUTIDE 1.5 MG/.5ML
1.5 INJECTION, SOLUTION SUBCUTANEOUS WEEKLY
Qty: 2 ML | Refills: 0 | Status: SHIPPED | OUTPATIENT
Start: 2025-06-05

## 2025-06-05 RX ORDER — DULAGLUTIDE 1.5 MG/.5ML
1.5 INJECTION, SOLUTION SUBCUTANEOUS WEEKLY
Qty: 2 ML | Refills: 0 | OUTPATIENT
Start: 2025-06-05

## 2025-07-07 ENCOUNTER — OFFICE VISIT (OUTPATIENT)
Age: 50
End: 2025-07-07
Payer: MEDICAID

## 2025-07-07 VITALS
SYSTOLIC BLOOD PRESSURE: 110 MMHG | OXYGEN SATURATION: 98 % | WEIGHT: 174 LBS | TEMPERATURE: 97 F | DIASTOLIC BLOOD PRESSURE: 58 MMHG | HEIGHT: 59 IN | HEART RATE: 82 BPM | BODY MASS INDEX: 35.08 KG/M2

## 2025-07-07 DIAGNOSIS — E11.65 UNCONTROLLED TYPE 2 DIABETES MELLITUS WITH HYPERGLYCEMIA (HCC): ICD-10-CM

## 2025-07-07 DIAGNOSIS — Z12.31 ENCOUNTER FOR SCREENING MAMMOGRAM FOR MALIGNANT NEOPLASM OF BREAST: Primary | ICD-10-CM

## 2025-07-07 RX ORDER — DULAGLUTIDE 1.5 MG/.5ML
1.5 INJECTION, SOLUTION SUBCUTANEOUS WEEKLY
Qty: 2 ML | Refills: 0 | Status: SHIPPED | OUTPATIENT
Start: 2025-07-07

## 2025-07-07 NOTE — PROGRESS NOTES
[facility] part of [facility]  Return Patient Progress Note      HPI:     Chief Complaint   Patient presents with    Physical     Annual physical     Shoulder Pain     Left shoulder pain due to work repetitive strain       Donna Robison is a 49 year old female presenting for:    Has a significant  has a past medical history of Diabetes (HCC) and Diabetes mellitus (HCC).    Here for annual    DM II supposed to be on Trulicity, farxiga and metformin. Has not been compliant.  Last check up was in October 2024.        Labs:   CMP:  Lab Results   Component Value Date/Time     07/16/2024 03:45 PM    K 3.6 07/16/2024 03:45 PM     07/16/2024 03:45 PM    CO2 28.0 07/16/2024 03:45 PM    CREATSERUM 0.58 07/16/2024 03:45 PM    CA 9.4 07/16/2024 03:45 PM     (H) 07/16/2024 03:45 PM    TP 7.0 07/16/2024 03:45 PM    ALB 4.2 07/16/2024 03:45 PM    ALKPHO 110 (H) 07/16/2024 03:45 PM    AST 33 07/16/2024 03:45 PM    ALT 49 07/16/2024 03:45 PM    BILT 0.2 (L) 07/16/2024 03:45 PM    TSH 1.435 01/31/2024 02:46 PM    T4F 0.9 01/31/2024 02:46 PM        CBC:  Lab Results   Component Value Date    WBC 7.5 07/16/2024    HGB 12.9 07/16/2024    HCT 40.4 07/16/2024    .0 07/16/2024    NEPERCENT 46.3 07/16/2024    LYPERCENT 43.9 07/16/2024    MOPERCENT 5.9 07/16/2024    EOPERCENT 3.5 07/16/2024    BAPERCENT 0.3 07/16/2024    NE 3.48 07/16/2024    LYMABS 3.29 07/16/2024    MOABSO 0.44 07/16/2024    EOABSO 0.26 07/16/2024    BAABSO 0.02 07/16/2024          Hemoglobin A1C, Microalbumin  Lab Results   Component Value Date/Time    A1C 7.2 (A) 10/16/2024 02:18 PM        Lipid panel  Lab Results   Component Value Date/Time    CHOLEST 257 (H) 01/31/2024 02:46 PM    HDL 50 01/31/2024 02:46 PM    TRIG 119 01/31/2024 02:46 PM     (H) 01/31/2024 02:46 PM    NONHDLC 207 (H) 01/31/2024 02:46 PM        Medications:  Current Medications[1]   PMH:  Past Medical History[2]            REVIEW OF SYSTEMS:   Review of  Systems   Constitutional:  Negative for chills, fatigue, fever and unexpected weight change.   HENT:  Negative for congestion, ear pain, hearing loss, rhinorrhea, sinus pain and sore throat.    Eyes:  Negative for pain, redness and visual disturbance.   Respiratory:  Negative for apnea, cough, chest tightness, shortness of breath and wheezing.    Cardiovascular:  Negative for chest pain, palpitations and leg swelling.   Gastrointestinal:  Negative for abdominal distention, abdominal pain, blood in stool, constipation and nausea.   Endocrine: Negative for cold intolerance, heat intolerance and polyuria.   Genitourinary:  Negative for dysuria, hematuria and urgency.   Musculoskeletal:  Negative for arthralgias, back pain, gait problem, joint swelling, myalgias and neck pain.   Skin:  Negative for rash and wound.   Allergic/Immunologic: Negative for food allergies and immunocompromised state.   Neurological:  Negative for dizziness, seizures, facial asymmetry, speech difficulty, weakness, light-headedness, numbness and headaches.   Hematological:  Negative for adenopathy. Does not bruise/bleed easily.   Psychiatric/Behavioral:  Negative for behavioral problems, sleep disturbance and suicidal ideas. The patient is not nervous/anxious.             PHYSICAL EXAM:   /58   Pulse 82   Temp 97.3 °F (36.3 °C)   Ht 4' 11\" (1.499 m)   Wt 174 lb (78.9 kg)   SpO2 98%   BMI 35.14 kg/m²  Estimated body mass index is 35.14 kg/m² as calculated from the following:    Height as of this encounter: 4' 11\" (1.499 m).    Weight as of this encounter: 174 lb (78.9 kg).     Wt Readings from Last 3 Encounters:   07/07/25 174 lb (78.9 kg)   10/16/24 173 lb (78.5 kg)   08/28/24 170 lb (77.1 kg)       Physical Exam  Vitals reviewed.   Constitutional:       General: She is not in acute distress.     Appearance: She is well-developed.   HENT:      Head: Normocephalic and atraumatic.   Eyes:      Conjunctiva/sclera: Conjunctivae normal.       Pupils: Pupils are equal, round, and reactive to light.   Neck:      Thyroid: No thyromegaly.   Cardiovascular:      Rate and Rhythm: Normal rate and regular rhythm.      Heart sounds: Normal heart sounds, S1 normal and S2 normal. No murmur heard.     No friction rub. No gallop.   Pulmonary:      Effort: Pulmonary effort is normal. No respiratory distress.      Breath sounds: Normal breath sounds. No wheezing or rales.   Chest:      Chest wall: No tenderness.   Abdominal:      General: Bowel sounds are normal. There is no distension.      Palpations: Abdomen is soft. There is no mass.      Tenderness: There is no abdominal tenderness. There is no guarding or rebound.   Musculoskeletal:         General: No tenderness. Normal range of motion.      Cervical back: Normal range of motion.   Lymphadenopathy:      Cervical: No cervical adenopathy.   Skin:     General: Skin is warm.      Findings: No erythema or rash.   Neurological:      Mental Status: She is alert and oriented to person, place, and time.      Cranial Nerves: No cranial nerve deficit.      Deep Tendon Reflexes: Reflexes are normal and symmetric.   Psychiatric:         Behavior: Behavior normal.         Thought Content: Thought content normal.         Judgment: Judgment normal.               ASSESSMENT AND PLAN:   Patient is a 49 year old female who presents primarily presents for:        (E11.65) Uncontrolled type 2 diabetes mellitus with hyperglycemia (HCC)  Non compliant with medications.  Will recheck A1c.     Extensive time spent discussing the risks of uncontrolled diabetes.  Recommendations for control of diabetes including appropriate monitoring of blood glucose, follow-up with specialists (ophthalmologist and podiatrist) and dietary recommendations discussed.      Dietary recommendations include  -  Carbohydrates: Focus on consuming complex carbohydrates like whole grains, legumes, fruits, and vegetables. These foods provide fiber and have a  slower impact on blood sugar levels compared to refined carbohydrates.    -  Fiber: Include high-fiber foods such as whole grains, vegetables, fruits, and legumes in your diet. Fiber helps regulate blood sugar levels and promotes overall digestive health.    -  Protein: Incorporate lean sources of protein like poultry, fish, tofu, beans, and low-fat dairy products. Protein helps with satiety and can help stabilize blood sugar levels.    -  Healthy fats: Choose sources of healthy fats like avocados, nuts, seeds, and olive oil. Limit saturated and trans fats found in fried foods, fatty meats, and processed snacks.    - Portion control: Pay attention to portion sizes to manage calorie intake and prevent blood sugar spikes. Consider working with a registered dietitian to determine appropriate portion sizes for your specific needs.    - Regular meals and snacks: Aim for regular meal times and spacing meals evenly throughout the day. This can help maintain stable blood sugar levels.    - Limit sugary foods and beverages: Minimize the consumption of sugary drinks, desserts, and processed snacks as they can cause rapid blood sugar spikes.      Discussed glucose targets:       -  Before a meal 80 to 130 mg/dL       -  Two hours after the start of a meal: Less than 180 mg/dL                      Health Maintenance:    Health Maintenance   Topic Date Due    Zoster Vaccines (1 of 2) Never done    DTaP,Tdap,and Td Vaccines (2 - Td or Tdap) 05/23/2024    COVID-19 Vaccine (5 - 2024-25 season) 09/01/2024    Diabetes Care: Microalb/Creat Ratio (Annual)  01/01/2025    Mammogram  03/09/2025    Diabetes Care Dilated Eye Exam  04/01/2025    Diabetes Care A1C  04/16/2025    Annual Physical  07/16/2025    Diabetes Care: GFR  07/16/2025    Diabetes Care Foot Exam  08/28/2025    Influenza Vaccine (1) 10/01/2025    Pap Smear  02/28/2027    Colorectal Cancer Screening  08/16/2034    Annual Depression Screening  Completed    Pneumococcal  Vaccine: 50+ Years  Completed    Meningococcal B Vaccine  Aged Out         Meds & Refills for this Visit:  Requested Prescriptions     Signed Prescriptions Disp Refills    Dulaglutide (TRULICITY) 1.5 MG/0.5ML Subcutaneous Solution Auto-injector 2 mL 0     Sig: Inject 1.5 mg into the skin once a week.    diclofenac 1 % External Gel 1 each 1     Sig: Apply 2 g topically 4 (four) times daily.       Orders Placed This Encounter   Procedures    Lipid Panel [E]    CBC W Differential W Platelet [E]    Comp Metabolic Panel (14) [E]    Hemoglobin A1C (Glycohemoglobin) [E]    Microalb/Creat Ratio, Random Urine       Imaging & Consults:  OPHTHALMOLOGY - INTERNAL  NATHAN ZOE 2D+3D SCREENING BILAT (CPT=77067/87157)          No follow-ups on file.  Important follow up notes/labs for next visit      Patient indicates understanding of the above recommendations and agrees to the above plan.  Reasurrance and education provided. All questions answered.    Notified to call with any questions, complications, allergies, or worsening or changing symptoms as well as any side effects or complications from the treatments .  Red flags/ ER precautions discussed.    If diagnostic labs or imaging ordered advised patient to contact my office for results  24-48 hours after completion    This note was dictated using dragon speech recognition transcription software.  Typographical and transcription errors may be present.  Please call if any questions.       As part of our commitment to providing you with comprehensive, transparent, and timely access to your health information, we adhere to the guidelines set forth by the 21st Century Cures Act. This Act enhances your rights to access your electronic health information and ensures that you can easily obtain your medical records.  Please note that the verbage used in this note is intended for medical documentation and communication and may be interpreted as forthright.  Please do not hesitate to contact  my office if you have any questions.            William Braun MD  [facility]                         [1]   Current Outpatient Medications   Medication Sig Dispense Refill    Dulaglutide (TRULICITY) 1.5 MG/0.5ML Subcutaneous Solution Auto-injector Inject 1.5 mg into the skin once a week. 2 mL 0    diclofenac 1 % External Gel Apply 2 g topically 4 (four) times daily. 1 each 1    Blood Glucose Monitoring Suppl (ONETOUCH ULTRA 2) w/Device Does not apply Kit 1 Device by Other route 2 (two) times daily. Use as directed. 1 kit 0    ONETOUCH DELICA PLUS [id number] Does not apply Misc USE TWICE DAILY 100 each 0    terbinafine (LAMISIL) 250 MG Oral Tab Take 1 tablet (250 mg total) by mouth daily. 30 tablet 0    ONETOUCH VERIO In Vitro Strip TEST TWICE DAILY 100 strip 0    dapagliflozin (FARXIGA) 10 MG Oral Tab Take 1 tablet (10 mg total) by mouth daily. 90 tablet 1    atorvastatin 20 MG Oral Tab Take 1 tablet (20 mg total) by mouth nightly. 90 tablet 1    metFORMIN HCl 1000 MG Oral Tab Take 1 tablet (1,000 mg total) by mouth 2 (two) times daily with meals. 180 tablet 1    Blood Gluc Meter Disp-Strips Does not apply Device Use with glucometer to check BG 2 times daily. 100 each 0    Blood Glucose Monitoring Suppl Does not apply Device Use glucometer to check blood glucose in am, fasting and 2 hrs after meal. 1 each 0   [2]   Past Medical History:   Diabetes (HCC)    Diabetes mellitus (HCC)

## 2025-07-12 ENCOUNTER — LAB ENCOUNTER (OUTPATIENT)
Dept: LAB | Age: 50
End: 2025-07-12
Attending: INTERNAL MEDICINE
Payer: MEDICAID

## 2025-07-12 DIAGNOSIS — E11.65 UNCONTROLLED TYPE 2 DIABETES MELLITUS WITH HYPERGLYCEMIA (HCC): ICD-10-CM

## 2025-07-12 LAB
ALBUMIN SERPL-MCNC: 4.4 G/DL (ref 3.2–4.8)
ALBUMIN/GLOB SERPL: 1.8 {RATIO} (ref 1–2)
ALP LIVER SERPL-CCNC: 88 U/L (ref 41–108)
ALT SERPL-CCNC: 22 U/L (ref 10–49)
ANION GAP SERPL CALC-SCNC: 9 MMOL/L (ref 0–18)
AST SERPL-CCNC: 19 U/L (ref ?–34)
BASOPHILS # BLD AUTO: 0.03 X10(3) UL (ref 0–0.2)
BASOPHILS NFR BLD AUTO: 0.4 %
BILIRUB SERPL-MCNC: 0.3 MG/DL (ref 0.3–1.2)
BUN BLD-MCNC: 12 MG/DL (ref 9–23)
BUN/CREAT SERPL: 24 (ref 10–20)
CALCIUM BLD-MCNC: 9.2 MG/DL (ref 8.7–10.4)
CHLORIDE SERPL-SCNC: 105 MMOL/L (ref 98–112)
CHOLEST SERPL-MCNC: 209 MG/DL (ref ?–200)
CO2 SERPL-SCNC: 28 MMOL/L (ref 21–32)
CREAT BLD-MCNC: 0.5 MG/DL (ref 0.55–1.02)
CREAT UR-SCNC: 73.3 MG/DL
DEPRECATED RDW RBC AUTO: 44.7 FL (ref 35.1–46.3)
EGFRCR SERPLBLD CKD-EPI 2021: 113 ML/MIN/1.73M2 (ref 60–?)
EOSINOPHIL # BLD AUTO: 0.29 X10(3) UL (ref 0–0.7)
EOSINOPHIL NFR BLD AUTO: 3.8 %
ERYTHROCYTE [DISTWIDTH] IN BLOOD BY AUTOMATED COUNT: 13.2 % (ref 11–15)
EST. AVERAGE GLUCOSE BLD GHB EST-MCNC: 146 MG/DL (ref 68–126)
FASTING PATIENT LIPID ANSWER: YES
FASTING STATUS PATIENT QL REPORTED: YES
GLOBULIN PLAS-MCNC: 2.5 G/DL (ref 2–3.5)
GLUCOSE BLD-MCNC: 105 MG/DL (ref 70–99)
HBA1C MFR BLD: 6.7 % (ref ?–5.7)
HCT VFR BLD AUTO: 41.5 % (ref 35–48)
HDLC SERPL-MCNC: 44 MG/DL (ref 40–59)
HGB BLD-MCNC: 13 G/DL (ref 12–16)
IMM GRANULOCYTES # BLD AUTO: 0.02 X10(3) UL (ref 0–1)
IMM GRANULOCYTES NFR BLD: 0.3 %
LDLC SERPL CALC-MCNC: 151 MG/DL (ref ?–100)
LYMPHOCYTES # BLD AUTO: 3.66 X10(3) UL (ref 1–4)
LYMPHOCYTES NFR BLD AUTO: 47.5 %
MCH RBC QN AUTO: 28.7 PG (ref 26–34)
MCHC RBC AUTO-ENTMCNC: 31.3 G/DL (ref 31–37)
MCV RBC AUTO: 91.6 FL (ref 80–100)
MICROALBUMIN UR-MCNC: <0.3 MG/DL
MONOCYTES # BLD AUTO: 0.6 X10(3) UL (ref 0.1–1)
MONOCYTES NFR BLD AUTO: 7.8 %
NEUTROPHILS # BLD AUTO: 3.11 X10 (3) UL (ref 1.5–7.7)
NEUTROPHILS # BLD AUTO: 3.11 X10(3) UL (ref 1.5–7.7)
NEUTROPHILS NFR BLD AUTO: 40.2 %
NONHDLC SERPL-MCNC: 165 MG/DL (ref ?–130)
OSMOLALITY SERPL CALC.SUM OF ELEC: 294 MOSM/KG (ref 275–295)
PLATELET # BLD AUTO: 198 10(3)UL (ref 150–450)
POTASSIUM SERPL-SCNC: 4.1 MMOL/L (ref 3.5–5.1)
PROT SERPL-MCNC: 6.9 G/DL (ref 5.7–8.2)
RBC # BLD AUTO: 4.53 X10(6)UL (ref 3.8–5.3)
SODIUM SERPL-SCNC: 142 MMOL/L (ref 136–145)
TRIGL SERPL-MCNC: 76 MG/DL (ref 30–149)
VLDLC SERPL CALC-MCNC: 14 MG/DL (ref 0–30)
WBC # BLD AUTO: 7.7 X10(3) UL (ref 4–11)

## 2025-07-12 PROCEDURE — 82570 ASSAY OF URINE CREATININE: CPT

## 2025-07-12 PROCEDURE — 83036 HEMOGLOBIN GLYCOSYLATED A1C: CPT

## 2025-07-12 PROCEDURE — 80053 COMPREHEN METABOLIC PANEL: CPT

## 2025-07-12 PROCEDURE — 82043 UR ALBUMIN QUANTITATIVE: CPT

## 2025-07-12 PROCEDURE — 85025 COMPLETE CBC W/AUTO DIFF WBC: CPT

## 2025-07-12 PROCEDURE — 80061 LIPID PANEL: CPT

## 2025-07-12 PROCEDURE — 36415 COLL VENOUS BLD VENIPUNCTURE: CPT

## 2025-08-04 ENCOUNTER — HOSPITAL ENCOUNTER (OUTPATIENT)
Dept: MAMMOGRAPHY | Facility: HOSPITAL | Age: 50
Discharge: HOME | End: 2025-08-04
Attending: INTERNAL MEDICINE

## 2025-08-04 DIAGNOSIS — Z12.31 ENCOUNTER FOR SCREENING MAMMOGRAM FOR MALIGNANT NEOPLASM OF BREAST: ICD-10-CM

## 2025-08-04 PROCEDURE — 77063 BREAST TOMOSYNTHESIS BI: CPT | Performed by: INTERNAL MEDICINE

## 2025-08-04 PROCEDURE — 77067 SCR MAMMO BI INCL CAD: CPT | Performed by: INTERNAL MEDICINE

## (undated) DEVICE — GIJAW SINGLE-USE BIOPSY FORCEPS WITH NEEDLE: Brand: GIJAW

## (undated) DEVICE — CO2 CANNULA,SSOFT,ADLT,7O2,4CO2,FEMALE: Brand: MEDLINE

## (undated) DEVICE — KIT ENDO ORCAPOD 160/180/190

## (undated) DEVICE — KIT MFLD FOR SPEC COLL

## (undated) DEVICE — MEDI-VAC NON-CONDUCTIVE SUCTION TUBING 6MM X 1.8M (6FT.) L: Brand: CARDINAL HEALTH

## (undated) DEVICE — KIT CLEAN ENDOKIT 1.1OZ GOWNX2

## (undated) DEVICE — SYRINGE, LUER SLIP, STERILE, 60ML: Brand: MEDLINE

## (undated) NOTE — LETTER
[facility]  Administration of Anesthesia  IDonna agree to be cared for by a physician anesthesiologist alone and/or with a nurse anesthetist, who is specially trained to monitor me and give me medicine to put me to sleep or keep me comfortable during my procedure    I understand that my anesthesiologist and/or anesthetist is not an employee or agent of [facility] or [employer]. He or she works for [facility]    As the patient asking for anesthesia services, I agree to:  Allow the anesthesiologist (anesthesia doctor) to give me medicine and do additional procedures as necessary. Some examples are: Starting or using an “IV” to give me medicine, fluids or blood during my procedure, and having a breathing tube placed to help me breathe when I’m asleep (intubation). In the event that my heart stops working properly, I understand that my anesthesiologist will make every effort to sustain my life, unless otherwise directed by [facility] Do Not Resuscitate documents.  Tell my anesthesia doctor before my procedure:  If I am pregnant.  The last time that I ate or drank.  iii. All of the medicines I take (including prescriptions, herbal supplements, and pills I can buy without a prescription (including street drugs/illegal medications). Failure to inform my anesthesiologist about these medicines may increase my risk of anesthetic complications.  iv.If I am allergic to anything or have had a reaction to anesthesia before.  I understand how the anesthesia medicine will help me (benefits).  I understand that with any type of anesthesia medicine there are risks:  The most common risks are: nausea, vomiting, sore throat, muscle soreness, damage to my eyes, mouth, or teeth (from breathing tube placement).  Rare risks include: remembering what happened during my procedure, allergic reactions to medications, injury to my airway, heart, lungs, vision, nerves, or muscles  and in extremely rare instances death.  My doctor has explained to me other choices available to me for my care (alternatives).  Pregnant Patients (“epidural”):  I understand that the risks of having an epidural (medicine given into my back to help control pain during labor), include itching, low blood pressure, difficulty urinating, headache or slowing of the baby’s heart. Very rare risks include infection, bleeding, seizure, irregular heart rhythms and nerve injury.  Regional Anesthesia (“spinal”, “epidural”, & “nerve blocks”):  I understand that rare but potential complications include headache, bleeding, infection, seizure, irregular heart rhythms, and nerve injury.    _____________________________________________________________________________  Patient (or Representative) Signature/Relationship to Patient  Date   Time    _____________________________________________________________________________   Name (if used)    Language/Organization   Time    _____________________________________________________________________________  Nurse Anesthetist Signature     Date   Time  _____________________________________________________________________________  Anesthesiologist Signature     Date   Time  I have discussed the procedure and information above with the patient (or patient’s representative) and answered their questions. The patient or their representative has agreed to have anesthesia services.    _____________________________________________________________________________  Witness        Date   Time  I have verified that the signature is that of the patient or patient’s representative, and that it was signed before the procedure  Patient Name: Donna Robison     : 1975                 Printed: 2024 at 11:17 AM    Medical Record #: [id number]                                            Page 1 of 1  ----------ANESTHESIA CONSENT----------

## (undated) NOTE — LETTER
Patient Name: Donna Robison : 1975  Medical Record #: [id number] Printed: 2024  Page 1 of 2                                          [facility]  [address], [place], [place]  Autorización para operación y procedimiento quirúrgico                                                                                                      Por la presente, autorizo a Nicholas Davis MD, mi médico y al asistente a realizar la operación/procedimiento quirúrgico a continuación, así gabriel a administrar la anestesia que determine necesaria mi médico Nombre (s) de la operación/procedimiento: COLONOSCOPY en Donna Robison     Reconozco que robyn la operación/procedimiento quirúrgico, las condiciones imprevistas pueden requerir de procedimientos adicionales o diferentes a aquellos mencionados anteriormente.  Por lo tanto, autorizo y solicito además que el cirujano antes mencionado, los asistentes o las personas designadas realicen los procedimientos que, a fernandez juicio, kirti necesarios y convenientes.    Mi cirujano/médico barnhart discutido antes de mi cirugía los posibles beneficios, riesgos y efectos secundarios de luigi procedimiento, la probabilidad de alcanzar las metas y los posibles problemas que puedan ocurrir robyn la recuperación.  Ellos también rush discutido las alternativas razonables al procedimiento, incluso los riesgos, beneficios y efectos secundarios relacionados con las alternativas y los riesgos relacionados con no realizar luigi procedimiento.  Rush respondido a todas mis preguntas y confirmo que no se ha dado ninguna garantía en cuanto a los resultados que pueda obtener.    En brennon de que surja la necesidad robyn mi operación o robyn el periodo postoperatorio, también autorizo se aplique mary y/o productos sanguíneos.  Asimismo, entiendo que a pesar de las cuidadosas pruebas y análisis de mary o de los productos sanguíneos que realizan las entidades recolectoras, todavía puedo  estar sujeto a efectos adversos gabriel resultado de recibir hamilton transfusión de mary y/o productos sanguíneos.  A continuación se mencionan algunos, aunque no todos, los riesgos potenciales que pueden ocurrir: fiebre y reacciones alérgicas, reacciones hemolíticas, trasmisión de enfermedades gabriel hepatitis, SIDA y citomegalovirus (CMV), así gabriel sobrecarga de líquidos.   En brennon de que desee tener hamilton transfusión autóloga de mi propia mary o hamilton transfusión de un donante dirigido.  Lo discutiré con mi médico.  Check only if Refusing Blood or Blood Products  I understand refusal of blood or blood products as deemed necessary by my physician may have serious consequences to my condition to include possible death. I hereby assume responsibility for my refusal and release the hospital, its personnel, and my physicians from any responsibility for the consequences of my refusal.           o  Refuse       Autorizo el uso de cualquier muestra, órgano, tejido, parte del cuerpo u objeto extraño que pueda ser extraído de mi cuerpo robyn la operación/procedimiento para fines de diagnóstico, investigación o de enseñanza y fernandez desecho posterior por las autoridades del hospital.  También, autorizo la revelación de los resultados de las pruebas de muestras y/o los informes escritos al médico tratante gabriel personal médico del hospital u otros médicos de referencia o consulta involucrados en mi atención, a discreción del patólogo o de mi médico tratante.    Doy consentimiento para que se fotografíen o graben vídeos de las operaciones o procedimientos a realizarse, incluidas las partes de mi cuerpo que kirti adecuadas para propósitos médicos, científicos o educativos, en el entendido de que, mi identidad no sea revelada por las fotografías o por textos descriptivos que las acompañen.  Si el procedimiento es fotografiado o grabado en vídeo, el cirujano obtendrá la imagen, cinta de vídeo o CD original.  El hospital no se hará  responsable por el almacenamiento, la revelación o el mantenimiento de la imagen, cinta o CD.    Autorizo la presencia de un especialista de producto u observadores en el quirófano, según lo considere necesario mi médico o las personas que éste designe.     Reconozco que en brennon de que mi procedimiento resulte en un tiempo prolongado de radiografía/fluoroscopia, puedo desarrollar hamilton reacción en la piel.    Si tengo hamilton orden de No intentar la reanimación (JESU), lance estado se suspenderá mientras esté en el quirófano, la destin de procedimientos y robyn el periodo de recuperación a menos que yo indique lo contrario explícitamente (o hamilton persona autorizada a blayne el consentimiento en mi nombre). El cirujano o mi médico tratante determinarán cuándo termina el periodo de recuperación aplicable a los efectos de restablecer la orden de JESU.  Pacientes que se realizan un procedimiento de esterilización: Entiendo que si el procedimiento tiene éxito, los resultados serán permanentes y que, por lo tanto, me será imposible inseminar, concebir o tener hijos.  Asimismo, entiendo que el procedimiento tiene gabriel propósito la esterilidad, aunque el resultado no está garantizado.   Admito que mi médico me ha explicado la aplicación de sedación/analgesia, incluidos los riesgos y beneficios y, consiento a la administración de sedación/analgesia conforme sea necesario o conveniente a juicio de mi médico.      CERTIFICO QUE HE LEÍDO Y COMPRENDIDO EL CONSENTIMIENTO ANTERIOR PARA LA OPERACIÓN y/o PROCEDIMIENTO.             ______________________________________  _______________________________  Firma del paciente      Firma de la persona responsible  Signature of patient      Signature of responsible person                        ___________________________________                                   Nombre en imprenta de la persona responsible         Name of responsible person          ___________________________________                  Relación con el paciente         Relationship to patient    _________________________________________  ______________ ________________  Firma del testigo          Fecha / Date Hora / Time  Signature of witness    DECLARACÓN DEL MÉDICO Mediante mi firma al calce, afirmo que antes de la hora del procedimiento, he explicadoal paciente y/o a fernandez representante legal, los riesgos y beneficios involucrados en el tratamiento propuesto, así comocualquier alternativa razonable al tratamiento propuesto. También le(s) he explicado los riesgos y beneficios involucradosen el rechazo del tratarniento propuesto y alternativas al tratamiento propuesto, y he respondido a las preguntas del(la) paciente(My signature below affirms that prior to the time of the procedure, I have explained to the patient and/or his/her guardian, therisks and benefits involved in the proposed treatment and any reasonable alternative to the proposed treatment. I have alsoexplained the risks and benefits involved in refusal of the proposed treatment and have answered the patient's questions.)    ________________________________________   _________________________   _____________   (Firma del médico/Signature of Physician)                                    (Fecha/Date)                                             (Hora/Time)      Patient Name: Donna Robison     : 1975                 Printed: 2024      Medical Record #: [id number]                                              Page 2 of 2

## (undated) NOTE — LETTER
[facility]   Administracion de Anestesia  Yo, Donna Robison, acepto ser atendido por un anestesiólogo, quien está especialmente capacitado para monitorearme y darme medicamento para hacerme dormir o mantenerme cómodo robyn mi procedimiento.   Entiendo que mi anestesiólogo no es un empleado o agente de [facility]. Él o emi trabaja para [facility]  Jami el paciente que solicita los servicios de anestesia, estoy de acuerdo con lo siguiente:  Permitir al anestesiólogo (médico de anestesia) que me suministre el medicamento y hacer los procedimientos adicionales que kirti necesarios. Algunos ejemplos son: Al iniciar o utilizar hamilton “IV” para suministrarme medicamentos, fluidos o mary robyn mi procedimiento, y colocarme hamilton sonda de respiración, me ayudará a respirar cuando esté dormido (intubación). En el brennon de que mi corazón deje de funcionar correctamente, entiendo que mi anestesiólogo hará todo lo posible para salvar mi sandy, a menos que los documentos de No resucitar de [facility] lo indiquen de otra manera.  Informar a mi anestesista antes del procedimiento:  Si estoy embarazada.  La última vez que comí o bebí algo.  Todos los medicamentos que royal (incluyendo medicamentos recetados, suplementos herbales y pastillas que puedo comprar sin receta médica (incluyendo drogas en la al [medicamentos ilegales]). No informar a mi anestesiólogo acerca de estos medicamentos puede aumentar mi riesgo de complicaciones con la anestesia.  Si soy alérgico a cualquier cosa o he tenido previamente hamilton reacción adversa a la anestesia.  Entiendo cómo la anestesia me ayudará (beneficios).  Entiendo que con cualquier tipo de anestesia hay riesgos:  Los riesgos más comunes son: náuseas, vómitos, dolor de garganta, dolor muscular, daño a los ojos, la boca o los dientes (por la colocación de la sonda de respiración).  Los riesgos poco comunes incluyen: recordar lo  que sucedió robyn mi procedimiento, reacciones alérgicas a los medicamentos, lesiones en las vías respiratorias, el corazón, los pulmones, la visión, los nervios o músculos, y en casos sumamente raros, la muerte.  Mii médico me ha explicado otras opciones de atención disponibles para mí (alternativas).  Pacientes embarazadas (“epidural”):    Entiendo que los riesgos de recibir hamilton inyección epidural (medicamento que se aplica en la espalda para ayudar a controlar el dolor robyn el parto), incluyen picazón, presión arterial baja, dificultad para orinar, dolor de carmen o disminución en el ritmo del corazón del bebé. Los riesgos muy poco comunes incluyen infección, hemorragia, convulsiones, ritmo cardíaco irregular y lesión del nervio.  Anestesia regional (“columna vertebral”, “epidural” y “bloqueo de los nervios”):  Entiendo que hay complicaciones poco frecuentes annia posibles, e incluyen dolor de carmen, sangrado, infección, convulsiones, ritmo cardíaco irregular y la lesión del nervio.  .   Puedo cambiar de opinión acerca de recibir servicios de anestesia en cualquier momento antes de que se me administre el medicamento.         Paciente (o representante) Firma/Relación con el paciente  Fecha  Hora  Patient Signature/Signature of Responsible person Date Time           Nombre del intérprete (en fernandez brennon)  Idioma/Organización  Hora  Name of  Language/Organization Time          Firma del anestesiólogo Fecha  Hora  Signature of anesthesiologist Date Time    He hablado del procedimiento y la información anterior con el paciente (o el representante del paciente) y respondí albert preguntas. El paciente o fernandez representante ha aceptado recibir los servicios de anestesia.         Testigo Fecha  Hora  Witness Date Time  He verificado que la firma es la del paciente o del representante del paciente, y que se firmó antes del procedimiento.    Nombre del paciente: Donna Robison Fec. de Nac.: 9/7/1975                  En letra de imprenta: August 16, 2024  Expediente médico No. [id number]                         Página 1 de 2  ----------ANESTHESIA CONSENT----------

## (undated) NOTE — MR AVS SNAPSHOT
After Visit Summary   2/28/2024    Donna Robison   MRN: [id number]           Visit Information     Date & Time  2/28/2024  1:00 PM Provider  Gale Sotomayor MD [facility] Phone  [phone]      Your Vitals Were  Most recent update: 2/28/2024  1:08 PM    BP   110/72    Ht   59\"    Wt   181 lb 12.8 oz    LMP   11/23/2023    BMI   36.72 kg/m²         Allergies as of 2/28/2024  Review status set to Review Complete on 2/28/2024   No Known Allergies     Your Current Medications        Dosage    dapagliflozin (FARXIGA) 10 MG Oral Tab Take 1 tablet (10 mg total) by mouth daily.    atorvastatin 20 MG Oral Tab Take 1 tablet (20 mg total) by mouth nightly.    Cholecalciferol (VITAMIN D) 25 MCG (1000 UT) Oral Tab Take 1 tablet by mouth daily.    diclofenac 1 % External Gel Apply 2 g topically 4 (four) times daily.    terbinafine (LAMISIL) 250 MG Oral Tab Take 1 tablet (250 mg total) by mouth daily.    Alcohol Swabs Does not apply Pads Clean site with alcohol wipe. Allow to dry.    Lancets Does not apply Misc Use with glucometer, check BG 2 times daily.    Blood Gluc Meter Disp-Strips Does not apply Device Use with glucometer to check BG 2 times daily.    Blood Glucose Monitoring Suppl Does not apply Device Use glucometer to check blood glucose in am, fasting and 2 hrs after meal.      Diagnoses for This Visit    Encounter for annual routine gynecological examination   [2210310]  -  Primary  Screening for cervical cancer   [987283]             We Ordered the Following     Normal Orders This Visit    Hpv Dna  High Risk , Thin Prep Collect [[id number] CUSTOM]     THINPREP PAP SMEAR ONLY [[id number] CUSTOM]     ThinPrep PAP Smear [[id number] CUSTOM]     Future Labs/Procedures Expected by Expires    Hpv Dna  High Risk , Thin Prep Collect [[id number] CUSTOM]  2/28/2024 2/28/2025    ThinPrep PAP Smear [[id number] CUSTOM]  2/28/2024 2/28/2025                Did you know  that [facility] primary care physicians now offer Video Visits through [employer] for adult patients for a variety of conditions such as allergies, back pain and cold symptoms? Skip the drive and waiting room and online chat with a doctor face-to-face using your web-cam enabled computer or mobile device wherever you are. Video Visits cost $50 and can be paid hassle-free using a credit, debit, or health savings card.  Not active on [employer]? Ask us how to get signed up today!          If you receive a survey from [employer], please take a few minutes to complete it and provide feedback. We strive to deliver the best patient experience and are looking for ways to make improvements. Your feedback will help us do so. For more information on [employer] [facility], please visit [email or web address]           No text in SmartText           No text in SmartText

## (undated) NOTE — LETTER
[facility]  [address], [place], [place]    Authorization for Surgical Operation and Procedure                               I hereby authorize Nicholas Davis MD, my physician and his/her assistants (if applicable), which may include medical students, residents, and/or fellows, to perform the following surgical operation/ procedure and administer such anesthesia as may be determined necessary by my physician: Operation/Procedure name (s) COLONOSCOPY on Donna Robison   2.   I recognize that during the surgical operation/procedure, unforeseen conditions may necessitate additional or different procedures than those listed above.  I, therefore, further authorize and request that the above-named surgeon, assistants, or designees perform such procedures as are, in their judgment, necessary and desirable.    3.   My surgeon/physician has discussed prior to my surgery the potential benefits, risks and side effects of this procedure; the likelihood of achieving goals; and potential problems that might occur during recuperation.  They also discussed reasonable alternatives to the procedure, including risks, benefits, and side effects related to the alternatives and risks related to not receiving this procedure.  I have had all my questions answered and I acknowledge that no guarantee has been made as to the result that may be obtained.    4.   Should the need arise during my operation/procedure, which includes change of level of care prior to discharge, I also consent to the administration of blood and/or blood products.  Further, I understand that despite careful testing and screening of blood or blood products by collecting agencies, I may still be subject to ill effects as a result of receiving a blood transfusion and/or blood products.  The following are some, but not all, of the potential risks that can occur: fever and allergic reactions, hemolytic reactions, transmission of diseases such as  Hepatitis, AIDS and Cytomegalovirus (CMV) and fluid overload.  In the event that I wish to have an autologous transfusion of my own blood, or a directed donor transfusion, I will discuss this with my physician.  Check only if Refusing Blood or Blood Products  I understand refusal of blood or blood products as deemed necessary by my physician may have serious consequences to my condition to include possible death. I hereby assume responsibility for my refusal and release the hospital, its personnel, and my physicians from any responsibility for the consequences of my refusal.    o  Refuse   5.   I authorize the use of any specimen, organs, tissues, body parts or foreign objects that may be removed from my body during the operation/procedure for diagnosis, research or teaching purposes and their subsequent disposal by hospital authorities.  I also authorize the release of specimen test results and/or written reports to my treating physician on the hospital medical staff or other referring or consulting physicians involved in my care, at the discretion of the Pathologist or my treating physician.    6.   I consent to the photographing or videotaping of the operations or procedures to be performed, including appropriate portions of my body for medical, scientific, or educational purposes, provided my identity is not revealed by the pictures or by descriptive texts accompanying them.  If the procedure has been photographed/videotaped, the surgeon will obtain the original picture, image, videotape or CD.  The hospital will not be responsible for storage, release or maintenance of the picture, image, tape or CD.    7.   I consent to the presence of a  or observers in the operating room as deemed necessary by my physician or their designees.    8.   I recognize that in the event my procedure results in extended X-Ray/fluoroscopy time, I may develop a skin reaction.    9. If I have a Do Not Attempt  Resuscitation (DNAR) order in place, that status will be suspended while in the operating room, procedural suite, and during the recovery period unless otherwise explicitly stated by me (or a person authorized to consent on my behalf). The surgeon or my attending physician will determine when the applicable recovery period ends for purposes of reinstating the DNAR order.  10. Patients having a sterilization procedure: I understand that if the procedure is successful the results will be permanent and it will therefore be impossible for me to inseminate, conceive, or bear children.  I also understand that the procedure is intended to result in sterility, although the result has not been guaranteed.   11. I acknowledge that my physician has explained sedation/analgesia administration to me including the risk and benefits I consent to the administration of sedation/analgesia as may be necessary or desirable in the judgment of my physician.    I CERTIFY THAT I HAVE READ AND FULLY UNDERSTAND THE ABOVE CONSENT TO OPERATION and/or OTHER PROCEDURE.     ____________________________________  _________________________________        ______________________________  Signature of Patient    Signature of Responsible Person                Printed Name of Responsible Person                                      ____________________________________  _____________________________                ________________________________  Signature of Witness        Date  Time         Relationship to Patient    STATEMENT OF PHYSICIAN My signature below affirms that prior to the time of the procedure; I have explained to the patient and/or his/her legal representative, the risks and benefits involved in the proposed treatment and any reasonable alternative to the proposed treatment. I have also explained the risks and benefits involved in refusal of the proposed treatment and alternatives to the proposed treatment and have answered the patient's  questions. If I have a significant financial interest in a co-management agreement or a significant financial interest in any product or implant, or other significant relationship used in this procedure/surgery, I have disclosed this and had a discussion with my patient.     _____________________________________________________              _____________________________  (Signature of Physician)                                                                                         (Date)                                   (Time)  Patient Name: Donna Robison      : 1975      Printed: 2024     Medical Record #: [id number]                                      Page 1 of 1